# Patient Record
Sex: MALE | Race: WHITE | NOT HISPANIC OR LATINO | URBAN - METROPOLITAN AREA
[De-identification: names, ages, dates, MRNs, and addresses within clinical notes are randomized per-mention and may not be internally consistent; named-entity substitution may affect disease eponyms.]

---

## 2017-10-06 ENCOUNTER — APPOINTMENT (OUTPATIENT)
Dept: URBAN - METROPOLITAN AREA CLINIC 200 | Age: 77
Setting detail: DERMATOLOGY
End: 2017-10-15

## 2017-10-06 DIAGNOSIS — L57.8 OTHER SKIN CHANGES DUE TO CHRONIC EXPOSURE TO NONIONIZING RADIATION: ICD-10-CM

## 2017-10-06 DIAGNOSIS — B07.8 OTHER VIRAL WARTS: ICD-10-CM

## 2017-10-06 PROCEDURE — OTHER COUNSELING: OTHER

## 2017-10-06 PROCEDURE — 99213 OFFICE O/P EST LOW 20 MIN: CPT | Mod: 25

## 2017-10-06 PROCEDURE — 17110 DESTRUCT B9 LESION 1-14: CPT

## 2017-10-06 PROCEDURE — OTHER LIQUID NITROGEN: OTHER

## 2017-10-06 ASSESSMENT — LOCATION SIMPLE DESCRIPTION DERM
LOCATION SIMPLE: RIGHT KNEE
LOCATION SIMPLE: RIGHT UPPER BACK

## 2017-10-06 ASSESSMENT — LOCATION ZONE DERM
LOCATION ZONE: LEG
LOCATION ZONE: TRUNK

## 2017-10-06 ASSESSMENT — LOCATION DETAILED DESCRIPTION DERM
LOCATION DETAILED: RIGHT MID-UPPER BACK
LOCATION DETAILED: RIGHT KNEE

## 2017-10-06 NOTE — PROCEDURE: LIQUID NITROGEN
Include Z78.9 (Other Specified Conditions Influencing Health Status) As An Associated Diagnosis?: Yes
Consent: The patient's consent was obtained including but not limited to risks of crusting, scabbing, blistering, scarring, darker or lighter pigmentary change, recurrence, incomplete removal and infection.
Render Post-Care Instructions In Note?: no
Medical Necessity Information: It is in your best interest to select a reason for this procedure from the list below. All of these items fulfill various CMS LCD requirements except the new and changing color options.
Post-Care Instructions: I reviewed with the patient in detail post-care instructions. Patient is to wear sunprotection, and avoid picking at any of the treated lesions. Pt may apply Vaseline to crusted or scabbing areas.
Detail Level: Detailed
Medical Necessity Clause: This procedure was medically necessary because the lesions that were treated were: causing pain
Number Of Freeze-Thaw Cycles: 2 freeze-thaw cycles

## 2018-09-14 ENCOUNTER — TELEPHONE (OUTPATIENT)
Dept: PRIMARY CARE | Facility: CLINIC | Age: 78
End: 2018-09-14

## 2018-09-14 NOTE — TELEPHONE ENCOUNTER
This patient was told at August visit by Dr. Colorado to call and get order for labs to go to [facility] added to [facility].  I do not see order in last system for lab listing.

## 2018-09-17 DIAGNOSIS — D64.9 ANEMIA, UNSPECIFIED TYPE: ICD-10-CM

## 2018-09-17 DIAGNOSIS — Z12.5 SPECIAL SCREENING FOR MALIGNANT NEOPLASM OF PROSTATE: ICD-10-CM

## 2018-09-17 DIAGNOSIS — E78.00 PURE HYPERCHOLESTEROLEMIA: Primary | ICD-10-CM

## 2018-09-17 DIAGNOSIS — N39.41 URGE INCONTINENCE: ICD-10-CM

## 2018-10-04 ENCOUNTER — APPOINTMENT (OUTPATIENT)
Dept: URBAN - METROPOLITAN AREA CLINIC 200 | Age: 78
Setting detail: DERMATOLOGY
End: 2018-10-09

## 2018-10-04 DIAGNOSIS — L57.0 ACTINIC KERATOSIS: ICD-10-CM

## 2018-10-04 DIAGNOSIS — L57.8 OTHER SKIN CHANGES DUE TO CHRONIC EXPOSURE TO NONIONIZING RADIATION: ICD-10-CM

## 2018-10-04 DIAGNOSIS — D485 NEOPLASM OF UNCERTAIN BEHAVIOR OF SKIN: ICD-10-CM

## 2018-10-04 PROBLEM — Z85.828 PERSONAL HISTORY OF OTHER MALIGNANT NEOPLASM OF SKIN: Status: ACTIVE | Noted: 2018-10-04

## 2018-10-04 PROBLEM — D48.5 NEOPLASM OF UNCERTAIN BEHAVIOR OF SKIN: Status: ACTIVE | Noted: 2018-10-04

## 2018-10-04 LAB
ALBUMIN SERPL-MCNC: 3.9 G/DL (ref 3.6–5.1)
ALBUMIN/GLOB SERPL: 1.3 (CALC) (ref 1–2.5)
ALP SERPL-CCNC: 83 U/L (ref 40–115)
ALT SERPL-CCNC: 16 U/L (ref 9–46)
APPEARANCE UR: CLEAR
AST SERPL-CCNC: 19 U/L (ref 10–35)
BASOPHILS # BLD AUTO: 38 CELLS/UL (ref 0–200)
BASOPHILS NFR BLD AUTO: 0.6 %
BILIRUB SERPL-MCNC: 1 MG/DL (ref 0.2–1.2)
BILIRUB UR QL STRIP: NEGATIVE
BUN SERPL-MCNC: 14 MG/DL (ref 7–25)
BUN/CREAT SERPL: ABNORMAL (CALC) (ref 6–22)
CALCIUM SERPL-MCNC: 9.2 MG/DL (ref 8.6–10.3)
CHLORIDE SERPL-SCNC: 102 MMOL/L (ref 98–110)
CHOLEST SERPL-MCNC: 127 MG/DL
CHOLEST/HDLC SERPL: 3.3 (CALC)
CO2 SERPL-SCNC: 31 MMOL/L (ref 20–32)
COLOR UR: YELLOW
CREAT SERPL-MCNC: 0.83 MG/DL (ref 0.7–1.18)
EOSINOPHIL # BLD AUTO: 132 CELLS/UL (ref 15–500)
EOSINOPHIL NFR BLD AUTO: 2.1 %
ERYTHROCYTE [DISTWIDTH] IN BLOOD BY AUTOMATED COUNT: 12.1 % (ref 11–15)
GFR SERPL CREATININE-BSD FRML MDRD: 84 ML/MIN/1.73M2
GLOBULIN SER CALC-MCNC: 3.1 G/DL (CALC) (ref 1.9–3.7)
GLUCOSE SERPL-MCNC: 106 MG/DL (ref 65–99)
GLUCOSE UR QL STRIP: NEGATIVE
HCT VFR BLD AUTO: 41.2 % (ref 38.5–50)
HDLC SERPL-MCNC: 39 MG/DL
HGB BLD-MCNC: 13.3 G/DL (ref 13.2–17.1)
HGB UR QL STRIP: NEGATIVE
KETONES UR QL STRIP: NEGATIVE
LDLC SERPL CALC-MCNC: 72 MG/DL (CALC)
LEUKOCYTE ESTERASE UR QL STRIP: NEGATIVE
LYMPHOCYTES # BLD AUTO: 1462 CELLS/UL (ref 850–3900)
LYMPHOCYTES NFR BLD AUTO: 23.2 %
MCH RBC QN AUTO: 30.7 PG (ref 27–33)
MCHC RBC AUTO-ENTMCNC: 32.3 G/DL (ref 32–36)
MCV RBC AUTO: 95.2 FL (ref 80–100)
MONOCYTES # BLD AUTO: 674 CELLS/UL (ref 200–950)
MONOCYTES NFR BLD AUTO: 10.7 %
NEUTROPHILS # BLD AUTO: 3994 CELLS/UL (ref 1500–7800)
NEUTROPHILS NFR BLD AUTO: 63.4 %
NITRITE UR QL STRIP: NEGATIVE
NONHDLC SERPL-MCNC: 88 MG/DL (CALC)
PH UR STRIP: 6 [PH] (ref 5–8)
PLATELET # BLD AUTO: 280 THOUSAND/UL (ref 140–400)
PMV BLD REES-ECKER: 9.7 FL (ref 7.5–12.5)
POTASSIUM SERPL-SCNC: 4.5 MMOL/L (ref 3.5–5.3)
PROT SERPL-MCNC: 7 G/DL (ref 6.1–8.1)
PROT UR QL STRIP: NEGATIVE
PSA SERPL-MCNC: 3.7 NG/ML
RBC # BLD AUTO: 4.33 MILLION/UL (ref 4.2–5.8)
SODIUM SERPL-SCNC: 139 MMOL/L (ref 135–146)
SP GR UR STRIP: 1.02 (ref 1–1.03)
TRIGL SERPL-MCNC: 78 MG/DL
WBC # BLD AUTO: 6.3 THOUSAND/UL (ref 3.8–10.8)

## 2018-10-04 PROCEDURE — 17003 DESTRUCT PREMALG LES 2-14: CPT

## 2018-10-04 PROCEDURE — OTHER COUNSELING: OTHER

## 2018-10-04 PROCEDURE — 11100: CPT | Mod: 59

## 2018-10-04 PROCEDURE — OTHER MIPS QUALITY: OTHER

## 2018-10-04 PROCEDURE — OTHER LIQUID NITROGEN: OTHER

## 2018-10-04 PROCEDURE — 99213 OFFICE O/P EST LOW 20 MIN: CPT | Mod: 25

## 2018-10-04 PROCEDURE — 17000 DESTRUCT PREMALG LESION: CPT

## 2018-10-04 PROCEDURE — OTHER BIOPSY BY SHAVE METHOD: OTHER

## 2018-10-04 ASSESSMENT — LOCATION SIMPLE DESCRIPTION DERM
LOCATION SIMPLE: RIGHT CHEEK
LOCATION SIMPLE: LEFT FOREHEAD
LOCATION SIMPLE: CHEST
LOCATION SIMPLE: RIGHT ANTERIOR NECK

## 2018-10-04 ASSESSMENT — LOCATION ZONE DERM
LOCATION ZONE: TRUNK
LOCATION ZONE: FACE
LOCATION ZONE: NECK

## 2018-10-04 ASSESSMENT — LOCATION DETAILED DESCRIPTION DERM
LOCATION DETAILED: RIGHT INFERIOR ANTERIOR NECK
LOCATION DETAILED: RIGHT SUPERIOR MEDIAL MALAR CHEEK
LOCATION DETAILED: LEFT SUPERIOR LATERAL FOREHEAD
LOCATION DETAILED: LEFT MEDIAL SUPERIOR CHEST

## 2018-10-04 NOTE — PROCEDURE: BIOPSY BY SHAVE METHOD
Notification Instructions: Patient will be notified of biopsy results. However, patient instructed to call the office if not contacted within 2 weeks.
Render Post-Care Instructions In Note?: no
Cryotherapy Text: The wound bed was treated with cryotherapy after the biopsy was performed.
Type Of Destruction Used: Curettage
Additional Anesthesia Volume In Cc (Will Not Render If 0): 0
Post-Care Instructions: I reviewed with the patient in detail post-care instructions. Patient is to keep the biopsy site dry overnight, and then apply bacitracin twice daily until healed. Patient may apply hydrogen peroxide soaks to remove any crusting.
Wound Care: Petrolatum
Hemostasis: Drysol
Was A Bandage Applied: Yes
Detail Level: Detailed
Consent: Written consent was obtained and risks were reviewed including but not limited to scarring, infection, bleeding, scabbing, incomplete removal, nerve damage and allergy to anesthesia.
Electrodesiccation Text: The wound bed was treated with electrodesiccation after the biopsy was performed.
Silver Nitrate Text: The wound bed was treated with silver nitrate after the biopsy was performed.
Anesthesia Volume In Cc (Will Not Render If 0): 0.5
Curettage Text: The wound bed was treated with curettage after the biopsy was performed.
Biopsy Type: H and E
Billing Type: Third-Party Bill
Biopsy Method: Dermablade
Depth Of Biopsy: dermis
Electrodesiccation And Curettage Text: The wound bed was treated with electrodesiccation and curettage after the biopsy was performed.
Dressing: bandage
Anesthesia Type: 0.5% lidocaine without epinephrine

## 2018-10-04 NOTE — PROCEDURE: LIQUID NITROGEN
Render Post-Care Instructions In Note?: no
Post-Care Instructions: I reviewed with the patient in detail post-care instructions. Patient is to wear sunprotection, and avoid picking at any of the treated lesions. Pt may apply Vaseline to crusted or scabbing areas.
Detail Level: Detailed
Duration Of Freeze Thaw-Cycle (Seconds): 2
Consent: The patient's consent was obtained including but not limited to risks of crusting, scabbing, blistering, scarring, darker or lighter pigmentary change, recurrence, incomplete removal and infection.

## 2019-03-08 ENCOUNTER — TELEPHONE (OUTPATIENT)
Dept: PRIMARY CARE | Facility: CLINIC | Age: 79
End: 2019-03-08

## 2019-03-08 NOTE — TELEPHONE ENCOUNTER
Patient has a labslip from Dr Cortes to have the following studies done:  CMP, CBC w/o diff, and Lipid panel with non HDL/C.  He has an appt here on 4/8/19.  Please let him know if you would like any additional labs. (I did fax the lab results done 10/3/18 to Dr Cortes this morning, as patient is not certain he received them).  Pt ph# [phone]

## 2019-03-27 LAB
ALBUMIN SERPL-MCNC: 4 G/DL (ref 3.6–5.1)
ALBUMIN/GLOB SERPL: 1.3 (CALC) (ref 1–2.5)
ALP SERPL-CCNC: 76 U/L (ref 40–115)
ALT SERPL-CCNC: 26 U/L (ref 9–46)
AST SERPL-CCNC: 31 U/L (ref 10–35)
BASOPHILS # BLD AUTO: 33 CELLS/UL (ref 0–200)
BASOPHILS NFR BLD AUTO: 0.5 %
BILIRUB SERPL-MCNC: 1 MG/DL (ref 0.2–1.2)
BUN SERPL-MCNC: 19 MG/DL (ref 7–25)
BUN/CREAT SERPL: NORMAL (CALC) (ref 6–22)
CALCIUM SERPL-MCNC: 9.3 MG/DL (ref 8.6–10.3)
CHLORIDE SERPL-SCNC: 104 MMOL/L (ref 98–110)
CHOLEST SERPL-MCNC: 148 MG/DL
CHOLEST/HDLC SERPL: 3.6 (CALC)
CO2 SERPL-SCNC: 30 MMOL/L (ref 20–32)
CREAT SERPL-MCNC: 0.85 MG/DL (ref 0.7–1.18)
EOSINOPHIL # BLD AUTO: 119 CELLS/UL (ref 15–500)
EOSINOPHIL NFR BLD AUTO: 1.8 %
ERYTHROCYTE [DISTWIDTH] IN BLOOD BY AUTOMATED COUNT: 12.1 % (ref 11–15)
GLOBULIN SER CALC-MCNC: 3.1 G/DL (CALC) (ref 1.9–3.7)
GLUCOSE SERPL-MCNC: 94 MG/DL (ref 65–99)
HCT VFR BLD AUTO: 42 % (ref 38.5–50)
HDLC SERPL-MCNC: 41 MG/DL
HGB BLD-MCNC: 13.8 G/DL (ref 13.2–17.1)
LDLC SERPL CALC-MCNC: 90 MG/DL (CALC)
LYMPHOCYTES # BLD AUTO: 1188 CELLS/UL (ref 850–3900)
LYMPHOCYTES NFR BLD AUTO: 18 %
MCH RBC QN AUTO: 30.6 PG (ref 27–33)
MCHC RBC AUTO-ENTMCNC: 32.9 G/DL (ref 32–36)
MCV RBC AUTO: 93.1 FL (ref 80–100)
MONOCYTES # BLD AUTO: 779 CELLS/UL (ref 200–950)
MONOCYTES NFR BLD AUTO: 11.8 %
NEUTROPHILS # BLD AUTO: 4481 CELLS/UL (ref 1500–7800)
NEUTROPHILS NFR BLD AUTO: 67.9 %
NONHDLC SERPL-MCNC: 107 MG/DL (CALC)
PLATELET # BLD AUTO: 270 THOUSAND/UL (ref 140–400)
PMV BLD REES-ECKER: 9.6 FL (ref 7.5–12.5)
POTASSIUM SERPL-SCNC: 4.4 MMOL/L (ref 3.5–5.3)
PROT SERPL-MCNC: 7.1 G/DL (ref 6.1–8.1)
QUEST EGFR NON-AFR. AMERICAN: 83 ML/MIN/1.73M2
RBC # BLD AUTO: 4.51 MILLION/UL (ref 4.2–5.8)
SODIUM SERPL-SCNC: 139 MMOL/L (ref 135–146)
TRIGL SERPL-MCNC: 79 MG/DL
WBC # BLD AUTO: 6.6 THOUSAND/UL (ref 3.8–10.8)

## 2019-04-08 ENCOUNTER — OFFICE VISIT (OUTPATIENT)
Dept: PRIMARY CARE | Facility: CLINIC | Age: 79
End: 2019-04-08
Payer: COMMERCIAL

## 2019-04-08 VITALS
DIASTOLIC BLOOD PRESSURE: 60 MMHG | WEIGHT: 166 LBS | HEIGHT: 66 IN | BODY MASS INDEX: 26.68 KG/M2 | OXYGEN SATURATION: 98 % | HEART RATE: 60 BPM | SYSTOLIC BLOOD PRESSURE: 132 MMHG

## 2019-04-08 DIAGNOSIS — I48.0 PAROXYSMAL ATRIAL FIBRILLATION (CMS/HCC): ICD-10-CM

## 2019-04-08 DIAGNOSIS — J45.30 MILD PERSISTENT ASTHMA WITHOUT COMPLICATION: ICD-10-CM

## 2019-04-08 DIAGNOSIS — E78.00 HYPERCHOLESTEROLEMIA: ICD-10-CM

## 2019-04-08 DIAGNOSIS — S89.92XA INJURY OF LEFT KNEE, INITIAL ENCOUNTER: Primary | ICD-10-CM

## 2019-04-08 PROCEDURE — 99215 OFFICE O/P EST HI 40 MIN: CPT | Performed by: INTERNAL MEDICINE

## 2019-04-08 RX ORDER — TAMSULOSIN HYDROCHLORIDE 0.4 MG/1
0.4 CAPSULE ORAL
COMMUNITY

## 2019-04-08 RX ORDER — ATORVASTATIN CALCIUM 40 MG/1
40 TABLET, FILM COATED ORAL DAILY
Qty: 90 TABLET | Refills: 3 | Status: SHIPPED | OUTPATIENT
Start: 2019-04-08 | End: 2020-01-16

## 2019-04-08 RX ORDER — MONTELUKAST SODIUM 10 MG/1
10 TABLET ORAL NIGHTLY
Qty: 90 TABLET | Refills: 3 | Status: SHIPPED | OUTPATIENT
Start: 2019-04-08 | End: 2020-01-16 | Stop reason: SDUPTHER

## 2019-04-08 RX ORDER — SOTALOL HYDROCHLORIDE 80 MG/1
80 TABLET ORAL 2 TIMES DAILY
COMMUNITY
Start: 2018-04-12 | End: 2025-02-13

## 2019-04-08 RX ORDER — RIVAROXABAN 20 MG/1
TABLET, FILM COATED ORAL
Refills: 2 | Status: ON HOLD | COMMUNITY
Start: 2019-03-30 | End: 2025-02-19

## 2019-04-08 RX ORDER — OMEGA-3-ACID ETHYL ESTERS 1 G/1
1000 CAPSULE, LIQUID FILLED ORAL DAILY
Status: ON HOLD | COMMUNITY
End: 2025-02-19

## 2019-04-08 RX ORDER — MONTELUKAST SODIUM 10 MG/1
10 TABLET ORAL
COMMUNITY
End: 2019-04-08 | Stop reason: SDUPTHER

## 2019-04-08 RX ORDER — ATORVASTATIN CALCIUM 40 MG/1
40 TABLET, FILM COATED ORAL DAILY
COMMUNITY
End: 2019-04-08 | Stop reason: SDUPTHER

## 2019-04-08 NOTE — PROGRESS NOTES
[facility]  Dr. Christine Coolrado  [address], [address]  [place] [address]  Phone: [phone]  Fax: [phone]      Patient ID: Bertrand Maher Jr                              : 1940    Visit Date: 2019    Chief Complaint: Follow-up (S/P fall from yesterday-injured left knee)      HPI  Fell over a stone wall yesterday and hurt his left knee. It is swollen medially. The original knee replacement was from  and the current partial is about 8 years ago. Has seen [facility] for the.right, Victor M.  Using Tylenol.  Hurts up and down the steps. Fell on the lawn.  Colonoscopy .  To [facility] next week.  SOB with climbing steps.  Still has it. Dr. Cortes to see him next week.  Also on the pt's list of concerns is that his wife thinks his memory is poor. He does not think so. He also does not think he is hard of hearing.        Subjective      Patient ID: Bertrand Maher Jr is a 79 y.o. male.    Patient Active Problem List   Diagnosis   • Allergic rhinitis   • Anemia   • Asthma   • BPH (benign prostatic hyperplasia)   • Cervical spondylosis   • Coronary artery stenosis   • Lumbar spondylosis   • History of knee replacement   • Osteoarthritis, hand   • Osteoarthritis of hip   • Other hypertrophic osteoarthropathy, multiple sites   • Atrial fibrillation (CMS/HCC)   • Status post angioplasty   • Hypercholesterolemia   • Urge incontinence   • Injury of left knee         Current Outpatient Prescriptions:   •  atorvastatin (LIPITOR) 40 mg tablet, Take 1 tablet (40 mg total) by mouth daily., Disp: 90 tablet, Rfl: 3  •  montelukast (SINGULAIR) 10 mg tablet, Take 1 tablet (10 mg total) by mouth nightly., Disp: 90 tablet, Rfl: 3  •  omega-3 acid ethyl esters (LOVAZA) 1 gram capsule, Take 1,000 mg by mouth daily., Disp: , Rfl:   •  sotalol (BETAPACE) 80 mg tablet, Take 80 mg by mouth 2 times daily., Disp: , Rfl:   •  tamsulosin (FLOMAX) 0.4 mg capsule, Take 0.4 mg by  mouth., Disp: , Rfl:   •  XARELTO 20 mg tablet, TAKE 1 TABLET BY MOUTH EVERY DAY WITH DINNER, Disp: , Rfl: 2    Allergies   Allergen Reactions   • Apixaban      Other reaction(s): Headache    • Oxycodone Anxiety       Social History     Social History   • Marital status:      Spouse name: N/A   • Number of children: N/A   • Years of education: N/A     Occupational History   • Not on file.     Social History Main Topics   • Smoking status: Never Smoker   • Smokeless tobacco: Never Used   • Alcohol use Not on file   • Drug use: Unknown   • Sexual activity: Not on file     Other Topics Concern   • Not on file     Social History Narrative   • No narrative on file       Health Maintenance   Topic Date Due   • DTaP, Tdap, and Td Vaccines (1 - Tdap) 02/28/1959   • Zoster Vaccine (1 of 2) 02/28/1990   • Annual Falls Risk Screening  02/28/2005   • Meningococcal ACWY  Aged Out   • Varicella Vaccines  Aged Out   • HIB Vaccines  Aged Out   • IPV Vaccines  Aged Out   • Influenza Vaccine  Completed   • HPV Vaccines  Aged Out   • Pneumococcal (0-64 years)  Aged Out   • Pneumococcal (65 years and older)  Completed       Past Medical History:   Diagnosis Date   • Allergic rhinitis    • Anemia    • Asthma    • Atrial fibrillation (CMS/HCC)    • BPH (benign prostatic hyperplasia)    • Cataract    • Cervical spondylosis    • Coronary artery stenosis    • History of knee replacement    • Hypercholesterolemia    • Lumbar spondylosis    • Osteoarthritis of hip    • Osteoarthritis, hand    • Other hypertrophic osteoarthropathy, multiple sites    • Status post angioplasty    • Urge incontinence        The following have been reviewed and updated as appropriate in this visit:  Allergies  Meds  Problems         Review of System  Review of Systems   Constitutional: Positive for activity change. Negative for chills, fatigue and unexpected weight change.   HENT: Negative for congestion, hearing loss, mouth sores and sinus pain.   "  Eyes: Negative for visual disturbance.   Respiratory: Negative for cough, chest tightness and shortness of breath.    Cardiovascular: Negative for chest pain and palpitations.   Gastrointestinal: Negative for abdominal pain, constipation and diarrhea.   Genitourinary: Negative for difficulty urinating and frequency.   Musculoskeletal: Positive for arthralgias, back pain and joint swelling (medial aspect of the right knee).   Neurological: Negative for dizziness, tremors, weakness and numbness.   Hematological: Negative for adenopathy. Does not bruise/bleed easily.       Objective     Vitals  Vitals:    04/08/19 1342   BP: 132/60   BP Location: Left upper arm   Patient Position: Sitting   Pulse: 60   SpO2: 98%   Weight: 75.3 kg (166 lb)   Height: 1.676 m (5' 6\")       Body mass index is 26.79 kg/m².    Physical Exam  Physical Exam   Constitutional: He is oriented to person, place, and time. He appears well-nourished. No distress (stiffness and pain with getting up and down from the exam table.).   HENT:   Head: Normocephalic and atraumatic.   Nose: Nose normal.   Mouth/Throat: Oropharynx is clear and moist.   Eyes: Pupils are equal, round, and reactive to light. Conjunctivae and EOM are normal.   Neck: Normal range of motion. Neck supple. No thyromegaly present.   Cardiovascular: Normal rate, regular rhythm and normal heart sounds.  Exam reveals no gallop.    No murmur heard.  Pulmonary/Chest: Effort normal and breath sounds normal. He has no wheezes. He has no rales.   Abdominal: Soft. Bowel sounds are normal. There is no tenderness.   Musculoskeletal: He exhibits deformity (left knee scar from replacement surgery with medial tenderness and swelling). He exhibits no edema.   Lymphadenopathy:     He has no cervical adenopathy.   Neurological: He is alert and oriented to person, place, and time. He displays normal reflexes. Coordination normal.   Skin: Skin is warm and dry.   Vitals reviewed.    Pt scored 30/30 on " MMSE performed during the visit.  Able to name 13 creatures in one minute.    Labs 3/26/2019  CMP, CBC and lipid panel normal.  GI letter 2/28/2019 screening recommended.    Assessment/Plan     Problem List Items Addressed This Visit     Asthma    Relevant Medications    montelukast (SINGULAIR) 10 mg tablet    Atrial fibrillation (CMS/HCC)     Has regular follow up with Dr. Cortes, cardiology.         Relevant Medications    XARELTO 20 mg tablet    sotalol (BETAPACE) 80 mg tablet    atorvastatin (LIPITOR) 40 mg tablet    Hypercholesterolemia    Relevant Medications    omega-3 acid ethyl esters (LOVAZA) 1 gram capsule    atorvastatin (LIPITOR) 40 mg tablet    Injury of left knee - Primary    Relevant Orders    Ambulatory referral to Orthopedic Surgery      Discussed the pt's prevention and his current complaints.   The knee is better today and he declines any assistance to schedule appointment with ortho. He will see the surgeon if the sxs at his knee persist  There is no significant evidence of dementia or significant cognitive impairment. Have encouraged the pt to discuss the circumstances that have concerned his wife. We can consider labs and perhaps an MRI of the brain should it make sense to do so.    Greater than 50% of the visit time was spent in counseling and coordination of care.  40 minutes.      There are no Patient Instructions on file for this visit.    Return in about 6 months (around 10/8/2019), or if symptoms worsen or fail to improve.      Christine Colorado MD  4/9/2019

## 2019-04-09 PROBLEM — S89.92XA INJURY OF LEFT KNEE: Status: ACTIVE | Noted: 2019-04-09

## 2019-04-09 ASSESSMENT — ENCOUNTER SYMPTOMS
NUMBNESS: 0
BRUISES/BLEEDS EASILY: 0
ACTIVITY CHANGE: 1
ARTHRALGIAS: 1
DIARRHEA: 0
FATIGUE: 0
ADENOPATHY: 0
FREQUENCY: 0
CHILLS: 0
COUGH: 0
SINUS PAIN: 0
TREMORS: 0
WEAKNESS: 0
JOINT SWELLING: 1
DIFFICULTY URINATING: 0
CONSTIPATION: 0
DIZZINESS: 0
SHORTNESS OF BREATH: 0
CHEST TIGHTNESS: 0
UNEXPECTED WEIGHT CHANGE: 0
BACK PAIN: 1
PALPITATIONS: 0
ABDOMINAL PAIN: 0

## 2019-05-03 ENCOUNTER — TRANSCRIBE ORDERS (OUTPATIENT)
Dept: SCHEDULING | Age: 79
End: 2019-05-03

## 2019-05-03 DIAGNOSIS — M54.50 LOW BACK PAIN: ICD-10-CM

## 2019-05-03 DIAGNOSIS — M25.561 PAIN IN RIGHT KNEE: Primary | ICD-10-CM

## 2019-05-08 ENCOUNTER — HOSPITAL ENCOUNTER (OUTPATIENT)
Dept: RADIOLOGY | Age: 79
Discharge: HOME | End: 2019-05-08
Attending: ORTHOPAEDIC SURGERY
Payer: COMMERCIAL

## 2019-05-08 DIAGNOSIS — M54.50 LOW BACK PAIN: ICD-10-CM

## 2019-05-08 DIAGNOSIS — M25.561 PAIN IN RIGHT KNEE: ICD-10-CM

## 2019-07-31 LAB
BUN SERPL-MCNC: 15 MG/DL (ref 7–25)
BUN/CREAT SERPL: NORMAL (CALC) (ref 6–22)
CALCIUM SERPL-MCNC: 9.1 MG/DL (ref 8.6–10.3)
CHLORIDE SERPL-SCNC: 102 MMOL/L (ref 98–110)
CO2 SERPL-SCNC: 31 MMOL/L (ref 20–32)
CREAT SERPL-MCNC: 0.84 MG/DL (ref 0.7–1.18)
GLUCOSE SERPL-MCNC: 97 MG/DL (ref 65–99)
MAGNESIUM SERPL-MCNC: 2.2 MG/DL (ref 1.5–2.5)
POTASSIUM SERPL-SCNC: 4.6 MMOL/L (ref 3.5–5.3)
QUEST EGFR NON-AFR. AMERICAN: 83 ML/MIN/1.73M2
SODIUM SERPL-SCNC: 139 MMOL/L (ref 135–146)

## 2019-08-09 LAB
ALBUMIN SERPL-MCNC: 4.1 G/DL (ref 3.6–5.1)
ALBUMIN/GLOB SERPL: 1.3 (CALC) (ref 1–2.5)
ALP SERPL-CCNC: 104 U/L (ref 40–115)
ALT SERPL-CCNC: 12 U/L (ref 9–46)
AST SERPL-CCNC: 18 U/L (ref 10–35)
BILIRUB DIRECT SERPL-MCNC: 0.2 MG/DL
BILIRUB INDIRECT SERPL-MCNC: 0.6 MG/DL (CALC) (ref 0.2–1.2)
BILIRUB SERPL-MCNC: 0.8 MG/DL (ref 0.2–1.2)
CHOLEST SERPL-MCNC: 138 MG/DL
CHOLEST/HDLC SERPL: 3.3 (CALC)
GLOBULIN SER CALC-MCNC: 3.1 G/DL (CALC) (ref 1.9–3.7)
HDLC SERPL-MCNC: 42 MG/DL
LDLC SERPL CALC-MCNC: 83 MG/DL (CALC)
NONHDLC SERPL-MCNC: 96 MG/DL (CALC)
PROT SERPL-MCNC: 7.2 G/DL (ref 6.1–8.1)
TRIGL SERPL-MCNC: 54 MG/DL

## 2019-10-03 ENCOUNTER — APPOINTMENT (OUTPATIENT)
Dept: URBAN - METROPOLITAN AREA CLINIC 200 | Age: 79
Setting detail: DERMATOLOGY
End: 2019-10-15

## 2019-10-03 DIAGNOSIS — D18.0 HEMANGIOMA: ICD-10-CM

## 2019-10-03 DIAGNOSIS — L91.8 OTHER HYPERTROPHIC DISORDERS OF THE SKIN: ICD-10-CM

## 2019-10-03 DIAGNOSIS — Z85.828 PERSONAL HISTORY OF OTHER MALIGNANT NEOPLASM OF SKIN: ICD-10-CM

## 2019-10-03 DIAGNOSIS — L57.8 OTHER SKIN CHANGES DUE TO CHRONIC EXPOSURE TO NONIONIZING RADIATION: ICD-10-CM

## 2019-10-03 PROBLEM — D23.5 OTHER BENIGN NEOPLASM OF SKIN OF TRUNK: Status: ACTIVE | Noted: 2019-10-03

## 2019-10-03 PROBLEM — L55.1 SUNBURN OF SECOND DEGREE: Status: ACTIVE | Noted: 2019-10-03

## 2019-10-03 PROBLEM — L57.0 ACTINIC KERATOSIS: Status: ACTIVE | Noted: 2019-10-03

## 2019-10-03 PROBLEM — D18.01 HEMANGIOMA OF SKIN AND SUBCUTANEOUS TISSUE: Status: ACTIVE | Noted: 2019-10-03

## 2019-10-03 PROCEDURE — OTHER COUNSELING: OTHER

## 2019-10-03 PROCEDURE — OTHER MIPS QUALITY: OTHER

## 2019-10-03 PROCEDURE — 99213 OFFICE O/P EST LOW 20 MIN: CPT

## 2019-10-03 PROCEDURE — OTHER REASSURANCE: OTHER

## 2019-10-03 ASSESSMENT — LOCATION DETAILED DESCRIPTION DERM
LOCATION DETAILED: LEFT MEDIAL SUPERIOR CHEST
LOCATION DETAILED: RIGHT MEDIAL MALAR CHEEK
LOCATION DETAILED: LEFT INFERIOR ANTERIOR NECK
LOCATION DETAILED: STERNAL NOTCH
LOCATION DETAILED: LEFT SUPERIOR FOREHEAD

## 2019-10-03 ASSESSMENT — LOCATION SIMPLE DESCRIPTION DERM
LOCATION SIMPLE: RIGHT CHEEK
LOCATION SIMPLE: LEFT FOREHEAD
LOCATION SIMPLE: CHEST
LOCATION SIMPLE: LEFT ANTERIOR NECK

## 2019-10-03 ASSESSMENT — LOCATION ZONE DERM
LOCATION ZONE: FACE
LOCATION ZONE: TRUNK
LOCATION ZONE: NECK

## 2019-10-03 NOTE — PROCEDURE: REASSURANCE
Detail Level: Detailed
Include Location In Plan?: Yes
Hide Additional Notes?: No
Detail Level: Generalized
Additional Note: Cautery
Additional Note: Cryo, cautery

## 2019-10-03 NOTE — PROCEDURE: MIPS QUALITY
Detail Level: Detailed
Additional Notes: Shingles SHINGRIX vaccine not received due to it being on back order.
Quality 110: Preventive Care And Screening: Influenza Immunization: Influenza Immunization not Administered for Documented Reasons.
Quality 474: Zoster Vaccination Status: Shingrix vaccine was not administered for reasons documented by clinician (e.g. patient administered vaccine other than Shingrix, patient allergy or other medical reasons, patient declined or other patient reasons, vaccine not available or other system reasons)
Additional Notes: Patient has not received flu shot, but plans to.

## 2019-10-21 ENCOUNTER — TELEPHONE (OUTPATIENT)
Dept: PRIMARY CARE | Facility: CLINIC | Age: 79
End: 2019-10-21

## 2020-01-16 ENCOUNTER — OFFICE VISIT (OUTPATIENT)
Dept: PRIMARY CARE | Facility: CLINIC | Age: 80
End: 2020-01-16
Payer: COMMERCIAL

## 2020-01-16 VITALS
HEART RATE: 57 BPM | WEIGHT: 165 LBS | DIASTOLIC BLOOD PRESSURE: 60 MMHG | BODY MASS INDEX: 26.52 KG/M2 | HEIGHT: 66 IN | OXYGEN SATURATION: 96 % | SYSTOLIC BLOOD PRESSURE: 110 MMHG

## 2020-01-16 DIAGNOSIS — R06.02 SHORTNESS OF BREATH: Primary | ICD-10-CM

## 2020-01-16 DIAGNOSIS — D48.5 NEOPLASM OF UNCERTAIN BEHAVIOR OF SKIN: ICD-10-CM

## 2020-01-16 DIAGNOSIS — M25.561 CHRONIC PAIN OF RIGHT KNEE: ICD-10-CM

## 2020-01-16 DIAGNOSIS — I48.0 PAROXYSMAL ATRIAL FIBRILLATION (CMS/HCC): ICD-10-CM

## 2020-01-16 DIAGNOSIS — K12.30 MUCOSITIS: ICD-10-CM

## 2020-01-16 DIAGNOSIS — J45.30 MILD PERSISTENT ASTHMA WITHOUT COMPLICATION: ICD-10-CM

## 2020-01-16 DIAGNOSIS — R97.20 ELEVATED PSA: ICD-10-CM

## 2020-01-16 DIAGNOSIS — N40.0 BENIGN PROSTATIC HYPERPLASIA WITHOUT LOWER URINARY TRACT SYMPTOMS: ICD-10-CM

## 2020-01-16 DIAGNOSIS — I25.10 CORONARY ARTERY STENOSIS: ICD-10-CM

## 2020-01-16 DIAGNOSIS — G89.29 CHRONIC PAIN OF RIGHT KNEE: ICD-10-CM

## 2020-01-16 PROCEDURE — 99215 OFFICE O/P EST HI 40 MIN: CPT | Performed by: INTERNAL MEDICINE

## 2020-01-16 RX ORDER — ROSUVASTATIN CALCIUM 40 MG/1
40 TABLET, COATED ORAL
COMMUNITY
Start: 2019-12-22 | End: 2020-01-16 | Stop reason: SDUPTHER

## 2020-01-16 RX ORDER — ROSUVASTATIN CALCIUM 40 MG/1
40 TABLET, COATED ORAL
Qty: 90 TABLET | Refills: 3 | Status: SHIPPED | OUTPATIENT
Start: 2020-01-16

## 2020-01-16 RX ORDER — MONTELUKAST SODIUM 10 MG/1
10 TABLET ORAL NIGHTLY
Qty: 90 TABLET | Refills: 3 | Status: SHIPPED | OUTPATIENT
Start: 2020-01-16

## 2020-01-16 NOTE — PROGRESS NOTES
[facility]  Dr. Christine Colorado  [address], [address]  [place] [address]  Phone: [phone]  Fax: [phone]      Patient ID: Bertrand Maher Jr                              : 1940    Visit Date: 2020    Chief Complaint: Annual Exam      HPI  Recovering from a cold a week ago.  Seeing Dr. Cortes   Now a PPO.  Rosuvastatin.  Did not get the   Right knee is trouble. Planning on seeing Dr. Lima. Going from sitting to standing.  Very short of breath.  Does wood carving.  Bronchitis in his youth has left him short of breath.  Up ramp and 2 flights of steps.  Mouth sore dentist told him it would go away.  Dr. Dwyer retired and has Dr. Ackerman in April  Has a crusty lesion at the right shin. Does not seem to heal.      Current Outpatient Medications   Medication Sig Dispense Refill   • montelukast (SINGULAIR) 10 mg tablet Take 1 tablet (10 mg total) by mouth nightly. 90 tablet 3   • omega-3 acid ethyl esters (LOVAZA) 1 gram capsule Take 1,000 mg by mouth daily.     • rosuvastatin (CRESTOR) 40 mg tablet Take 1 tablet (40 mg total) by mouth once daily. 90 tablet 3   • sotalol (BETAPACE) 80 mg tablet Take 80 mg by mouth 2 times daily.     • tamsulosin (FLOMAX) 0.4 mg capsule Take 0.4 mg by mouth.     • XARELTO 20 mg tablet TAKE 1 TABLET BY MOUTH EVERY DAY WITH DINNER  2     No current facility-administered medications for this visit.        Allergies   Allergen Reactions   • Apixaban      Other reaction(s): Headache    • Oxycodone Anxiety       Past Surgical History:   Procedure Laterality Date   • REVISION TOTAL KNEE ARTHROPLASTY Right 2017   • TOTAL HIP ARTHROPLASTY Right 2017       Past Medical History:   Diagnosis Date   • Allergic rhinitis    • Anemia    • Asthma    • Atrial fibrillation (CMS/HCC)    • BPH (benign prostatic hyperplasia)    • Cataract    • Cervical spondylosis    • Coronary artery stenosis    • History of knee replacement    •  Hypercholesterolemia    • Lumbar spondylosis    • Osteoarthritis of hip    • Osteoarthritis, hand    • Other hypertrophic osteoarthropathy, multiple sites    • Status post angioplasty    • Urge incontinence        Health Maintenance   Topic Date Due   • DTaP, Tdap, and Td Vaccines (1 - Tdap) 02/28/1959   • Zoster Vaccine (1 of 2) 02/28/1990   • Annual Falls Risk Screening  02/28/2005   • Medicare Annual Wellness Visit  04/02/2020 (Originally 2/28/1958)   • Influenza Vaccine  Completed   • Pneumococcal (65 years and older)  Completed   • Meningococcal ACWY  Aged Out   • Varicella Vaccines  Aged Out   • HIB Vaccines  Aged Out   • IPV Vaccines  Aged Out   • HPV Vaccines  Aged Out   • Pneumococcal  Aged Out       Social History     Socioeconomic History   • Marital status:      Spouse name: None   • Number of children: None   • Years of education: None   • Highest education level: None   Occupational History   • None   Social Needs   • Financial resource strain: None   • Food insecurity:     Worry: None     Inability: None   • Transportation needs:     Medical: None     Non-medical: None   Tobacco Use   • Smoking status: Never Smoker   • Smokeless tobacco: Never Used   Substance and Sexual Activity   • Alcohol use: None   • Drug use: None   • Sexual activity: None   Lifestyle   • Physical activity:     Days per week: None     Minutes per session: None   • Stress: None   Relationships   • Social connections:     Talks on phone: None     Gets together: None     Attends Religion service: None     Active member of club or organization: None     Attends meetings of clubs or organizations: None     Relationship status: None   • Intimate partner violence:     Fear of current or ex partner: None     Emotionally abused: None     Physically abused: None     Forced sexual activity: None   Other Topics Concern   • None   Social History Narrative   • None       No family history on file.    The following have been reviewed  "and updated as appropriate in this visit:  Allergies  Meds  Problems         Review of Systems  Review of Systems   Constitutional: Negative for activity change, fatigue and unexpected weight change.   HENT: Negative for congestion.    Respiratory: Negative for cough, shortness of breath and stridor.    Cardiovascular: Negative for chest pain, palpitations and leg swelling.   Gastrointestinal: Negative for abdominal pain.   Genitourinary: Negative for difficulty urinating.   Musculoskeletal: Positive for arthralgias. Negative for gait problem.   Neurological: Negative for dizziness and headaches.   Hematological: Negative for adenopathy.        Vitals:    01/16/20 1402   BP: 110/60   BP Location: Left upper arm   Patient Position: Sitting   Pulse: (!) 57   SpO2: 96%   Weight: 74.8 kg (165 lb)   Height: 1.676 m (5' 6\")       Body mass index is 26.63 kg/m².    Physical Exam  Physical Exam   Constitutional: He is oriented to person, place, and time. He appears well-developed and well-nourished. No distress.   HENT:   Head: Normocephalic and atraumatic.   Nose: Nose normal.   Mouth/Throat: Oropharynx is clear and moist.   Neck: Normal range of motion. Neck supple. No thyromegaly present.   Cardiovascular: Normal rate, regular rhythm and normal heart sounds. Exam reveals no gallop.   No murmur heard.  Pulmonary/Chest: Effort normal and breath sounds normal. He has no wheezes. He has no rales.   Abdominal: Soft. Bowel sounds are normal. He exhibits no mass. There is no tenderness. There is no guarding.   Musculoskeletal: He exhibits deformity. He exhibits no edema or tenderness.   Lymphadenopathy:     He has no cervical adenopathy.   Neurological: He is alert and oriented to person, place, and time. He displays normal reflexes. Coordination normal.   Skin: Skin is warm.   Small raised gray dry appearing lesion right shin 0.7 cm   Psychiatric: He has a normal mood and affect. Judgment and thought content normal. "   Vitals reviewed.      Assessment/Plan     Problem List Items Addressed This Visit        Nervous    Chronic pain of right knee    Current Assessment & Plan     To see ortho. Consider second opinion.            Respiratory    Asthma    Relevant Medications    montelukast (SINGULAIR) 10 mg tablet    Shortness of breath - Primary    Relevant Orders    Pulmonary function tests Spirometry before and after bronchodilator, Diffusing capacity, Lung volumes (Plethysmography or Nitrogen washout)    CBC and differential    TSH    Urinalysis with Reflex Culture    Sedimentation rate, automated    X-RAY CHEST 2 VIEWS       Circulatory    Coronary artery stenosis    Relevant Medications    rosuvastatin (CRESTOR) 40 mg tablet    Other Relevant Orders    Comprehensive metabolic panel    Lipid panel    Atrial fibrillation (CMS/HCC)    Relevant Medications    rosuvastatin (CRESTOR) 40 mg tablet       Genitourinary    BPH (benign prostatic hyperplasia)       Infectious/Inflammatory    Mucositis    Current Assessment & Plan     Mucous membrane still sore. To try mouthwash daily.            Other    Elevated PSA    Current Assessment & Plan     Followed by urology.         Neoplasm of uncertain behavior of skin    Current Assessment & Plan     Follow up with dermatologist            Greater than 50% of the visit time was spent in counseling and coordination of care.  40 minutes.      There are no Patient Instructions on file for this visit.  No follow-ups on file.          Christine Colorado MD  1/19/2020

## 2020-01-19 PROBLEM — G89.29 CHRONIC PAIN OF RIGHT KNEE: Status: ACTIVE | Noted: 2020-01-19

## 2020-01-19 PROBLEM — M25.561 CHRONIC PAIN OF RIGHT KNEE: Status: ACTIVE | Noted: 2020-01-19

## 2020-01-19 PROBLEM — D48.5 NEOPLASM OF UNCERTAIN BEHAVIOR OF SKIN: Status: ACTIVE | Noted: 2020-01-19

## 2020-01-19 ASSESSMENT — ENCOUNTER SYMPTOMS
ABDOMINAL PAIN: 0
COUGH: 0
ACTIVITY CHANGE: 0
ADENOPATHY: 0
DIFFICULTY URINATING: 0
UNEXPECTED WEIGHT CHANGE: 0
SHORTNESS OF BREATH: 0
HEADACHES: 0
STRIDOR: 0
ARTHRALGIAS: 1
PALPITATIONS: 0
FATIGUE: 0
DIZZINESS: 0

## 2020-01-22 LAB
ALBUMIN SERPL-MCNC: 3.7 G/DL (ref 3.6–5.1)
ALBUMIN/GLOB SERPL: 1.2 (CALC) (ref 1–2.5)
ALP SERPL-CCNC: 68 U/L (ref 40–115)
ALT SERPL-CCNC: 15 U/L (ref 9–46)
APPEARANCE UR: CLEAR
AST SERPL-CCNC: 18 U/L (ref 10–35)
BACTERIA #/AREA URNS HPF: NORMAL /HPF
BACTERIA UR CULT: NORMAL
BASOPHILS # BLD AUTO: 29 CELLS/UL (ref 0–200)
BASOPHILS NFR BLD AUTO: 0.4 %
BILIRUB SERPL-MCNC: 0.6 MG/DL (ref 0.2–1.2)
BILIRUB UR QL STRIP: NEGATIVE
BUN SERPL-MCNC: 15 MG/DL (ref 7–25)
BUN/CREAT SERPL: ABNORMAL (CALC) (ref 6–22)
CALCIUM SERPL-MCNC: 8.7 MG/DL (ref 8.6–10.3)
CHLORIDE SERPL-SCNC: 106 MMOL/L (ref 98–110)
CHOLEST SERPL-MCNC: 128 MG/DL
CHOLEST/HDLC SERPL: 3.4 (CALC)
CO2 SERPL-SCNC: 32 MMOL/L (ref 20–32)
COLOR UR: YELLOW
CREAT SERPL-MCNC: 0.86 MG/DL (ref 0.7–1.18)
EOSINOPHIL # BLD AUTO: 180 CELLS/UL (ref 15–500)
EOSINOPHIL NFR BLD AUTO: 2.5 %
ERYTHROCYTE [DISTWIDTH] IN BLOOD BY AUTOMATED COUNT: 12 % (ref 11–15)
ERYTHROCYTE [SEDIMENTATION RATE] IN BLOOD BY WESTERGREN METHOD: 14 MM/H
GLOBULIN SER CALC-MCNC: 3 G/DL (CALC) (ref 1.9–3.7)
GLUCOSE SERPL-MCNC: 101 MG/DL (ref 65–99)
GLUCOSE UR QL STRIP: NEGATIVE
HCT VFR BLD AUTO: 39.9 % (ref 38.5–50)
HDLC SERPL-MCNC: 38 MG/DL
HGB BLD-MCNC: 12.8 G/DL (ref 13.2–17.1)
HGB UR QL STRIP: NEGATIVE
HYALINE CASTS #/AREA URNS LPF: NORMAL /LPF
KETONES UR QL STRIP: NEGATIVE
LDLC SERPL CALC-MCNC: 75 MG/DL (CALC)
LEUKOCYTE ESTERASE UR QL STRIP: NEGATIVE
LYMPHOCYTES # BLD AUTO: 1238 CELLS/UL (ref 850–3900)
LYMPHOCYTES NFR BLD AUTO: 17.2 %
MCH RBC QN AUTO: 30.3 PG (ref 27–33)
MCHC RBC AUTO-ENTMCNC: 32.1 G/DL (ref 32–36)
MCV RBC AUTO: 94.3 FL (ref 80–100)
MONOCYTES # BLD AUTO: 706 CELLS/UL (ref 200–950)
MONOCYTES NFR BLD AUTO: 9.8 %
NEUTROPHILS # BLD AUTO: 5047 CELLS/UL (ref 1500–7800)
NEUTROPHILS NFR BLD AUTO: 70.1 %
NITRITE UR QL STRIP: NEGATIVE
NONHDLC SERPL-MCNC: 90 MG/DL (CALC)
PH UR STRIP: 7 [PH] (ref 5–8)
PLATELET # BLD AUTO: 258 THOUSAND/UL (ref 140–400)
PMV BLD REES-ECKER: 9.7 FL (ref 7.5–12.5)
POTASSIUM SERPL-SCNC: 4.7 MMOL/L (ref 3.5–5.3)
PROT SERPL-MCNC: 6.7 G/DL (ref 6.1–8.1)
PROT UR QL STRIP: NEGATIVE
QUEST EGFR NON-AFR. AMERICAN: 82 ML/MIN/1.73M2
RBC # BLD AUTO: 4.23 MILLION/UL (ref 4.2–5.8)
RBC #/AREA URNS HPF: NORMAL /HPF
SODIUM SERPL-SCNC: 142 MMOL/L (ref 135–146)
SP GR UR STRIP: 1.02 (ref 1–1.03)
SQUAMOUS #/AREA URNS HPF: NORMAL /HPF
TRIGL SERPL-MCNC: 66 MG/DL
TSH SERPL-ACNC: 3.33 MIU/L (ref 0.4–4.5)
WBC # BLD AUTO: 7.2 THOUSAND/UL (ref 3.8–10.8)
WBC #/AREA URNS HPF: NORMAL /HPF

## 2020-01-28 ENCOUNTER — TELEPHONE (OUTPATIENT)
Dept: PRIMARY CARE | Facility: CLINIC | Age: 80
End: 2020-01-28

## 2020-01-28 NOTE — TELEPHONE ENCOUNTER
S/W pt, he just informed me that this is an ongoing issue with him. His previous primary care doctor worked him up for this issue already. He states they did not find anything of concern. He wishes to just repeat the CBC in 6-8 weeks and does not want any further testing at this time.

## 2020-01-28 NOTE — TELEPHONE ENCOUNTER
For starters, I recommend he check a stool card. If possible he should totally avoid NSAIDs for his pain. Thnx

## 2020-01-28 NOTE — TELEPHONE ENCOUNTER
S/W pt, he is having some diarrhea that has been going on for about 2 months, 1-2 times weekly. He has not had changes in his diet or otherwise that he can think of to bring this on. He denies abd pain/bloating, black or bloody stools. Do you have other recommendations for him with this concern? Informed of repeat CBC in 6-8 weeks.

## 2020-01-28 NOTE — TELEPHONE ENCOUNTER
----- Message from Christine Colorado MD sent at 1/28/2020  6:19 AM EST -----  Please contact the pt that his labs are all fine except the hemoglobin is slightly lower than his usual baseline. Uncertain why. It may be nothing. Recommend, as long as he has no GI symptoms, that we repeat a CBC in about 6 to 8 weeks. Thx

## 2020-02-04 ENCOUNTER — HOSPITAL ENCOUNTER (OUTPATIENT)
Dept: PULMONOLOGY | Facility: HOSPITAL | Age: 80
Discharge: HOME | End: 2020-02-04
Attending: INTERNAL MEDICINE
Payer: COMMERCIAL

## 2020-02-04 ENCOUNTER — HOSPITAL ENCOUNTER (OUTPATIENT)
Dept: RADIOLOGY | Age: 80
Discharge: HOME | End: 2020-02-04
Attending: INTERNAL MEDICINE
Payer: COMMERCIAL

## 2020-02-04 DIAGNOSIS — R06.02 SHORTNESS OF BREATH: ICD-10-CM

## 2020-02-04 PROCEDURE — 25000000 HC PHARMACY GENERAL

## 2020-02-04 PROCEDURE — 94726 PLETHYSMOGRAPHY LUNG VOLUMES: CPT

## 2020-02-04 PROCEDURE — 94729 DIFFUSING CAPACITY: CPT

## 2020-02-04 PROCEDURE — 71046 X-RAY EXAM CHEST 2 VIEWS: CPT

## 2020-02-04 PROCEDURE — 94060 EVALUATION OF WHEEZING: CPT

## 2020-02-04 RX ORDER — ALBUTEROL SULFATE 0.83 MG/ML
2.5 SOLUTION RESPIRATORY (INHALATION) ONCE
Status: COMPLETED | OUTPATIENT
Start: 2020-02-04 | End: 2020-02-04

## 2020-02-04 RX ADMIN — ALBUTEROL SULFATE 2.5 MG: 2.5 SOLUTION RESPIRATORY (INHALATION) at 14:38

## 2020-02-10 ENCOUNTER — TELEPHONE (OUTPATIENT)
Dept: PRIMARY CARE | Facility: CLINIC | Age: 80
End: 2020-02-10

## 2020-02-10 NOTE — TELEPHONE ENCOUNTER
SW pt he is aware of results and said he was no better on inhaler. He is aware of the need for an appointment he wanted to call back and make an appointment

## 2020-02-10 NOTE — TELEPHONE ENCOUNTER
----- Message from Christine Colorado MD sent at 2/10/2020  7:34 AM EST -----  Please inform the pt that his CXR is clear. The pulmonary function tests show he may have mild asthma. Ask if the pt could feel a difference when given the inhaler and repeated the test. We could consider giving him an inhaler to try and if he is int  erested, ask that he come in to discuss. Thx

## 2020-02-21 ENCOUNTER — TRANSCRIBE ORDERS (OUTPATIENT)
Dept: SCHEDULING | Age: 80
End: 2020-02-21

## 2020-02-21 DIAGNOSIS — Z96.651 PRESENCE OF RIGHT ARTIFICIAL KNEE JOINT: Primary | ICD-10-CM

## 2020-02-21 DIAGNOSIS — M25.561 PAIN IN RIGHT KNEE: ICD-10-CM

## 2020-02-25 ENCOUNTER — HOSPITAL ENCOUNTER (OUTPATIENT)
Dept: RADIOLOGY | Age: 80
Discharge: HOME | End: 2020-02-25
Attending: ORTHOPAEDIC SURGERY
Payer: COMMERCIAL

## 2020-02-25 DIAGNOSIS — M25.561 PAIN IN RIGHT KNEE: ICD-10-CM

## 2020-02-25 DIAGNOSIS — Z96.651 PRESENCE OF RIGHT ARTIFICIAL KNEE JOINT: ICD-10-CM

## 2020-02-26 ENCOUNTER — TELEPHONE (OUTPATIENT)
Dept: FAMILY MEDICINE | Facility: CLINIC | Age: 80
End: 2020-02-26

## 2020-02-26 NOTE — TELEPHONE ENCOUNTER
Patient has an appointment scheduled for 3-26-20 and wanted to get blood work done.  I told patient he had an open order for lab work from 1-16-20.  Is this okay?, or should other lab work be ordered?    I told patient I would mail the order to him.

## 2020-03-25 ENCOUNTER — TELEPHONE (OUTPATIENT)
Dept: PRIMARY CARE | Facility: CLINIC | Age: 80
End: 2020-03-25

## 2020-03-25 NOTE — TELEPHONE ENCOUNTER
Pt RUDY had a PFT done in 02/10/2020 stated and they sent a swab for an Alpha 1 test and he wanted to know the results advised him I can not see them advised him to call pulm and ask them for results

## 2020-06-26 LAB
ALBUMIN SERPL-MCNC: 4.3 G/DL (ref 3.6–5.1)
ALBUMIN/GLOB SERPL: 1.6 (CALC) (ref 1–2.5)
ALP SERPL-CCNC: 76 U/L (ref 35–144)
ALT SERPL-CCNC: 18 U/L (ref 9–46)
APPEARANCE UR: CLEAR
AST SERPL-CCNC: 25 U/L (ref 10–35)
BACTERIA #/AREA URNS HPF: NORMAL /HPF
BACTERIA UR CULT: NORMAL
BASOPHILS # BLD AUTO: 32 CELLS/UL (ref 0–200)
BASOPHILS NFR BLD AUTO: 0.5 %
BILIRUB SERPL-MCNC: 0.9 MG/DL (ref 0.2–1.2)
BILIRUB UR QL STRIP: NEGATIVE
BUN SERPL-MCNC: 21 MG/DL (ref 7–25)
BUN/CREAT SERPL: NORMAL (CALC) (ref 6–22)
CALCIUM SERPL-MCNC: 8.9 MG/DL (ref 8.6–10.3)
CHLORIDE SERPL-SCNC: 103 MMOL/L (ref 98–110)
CHOLEST SERPL-MCNC: 149 MG/DL
CHOLEST/HDLC SERPL: 3.2 (CALC)
CO2 SERPL-SCNC: 31 MMOL/L (ref 20–32)
COLOR UR: YELLOW
CREAT SERPL-MCNC: 0.87 MG/DL (ref 0.7–1.11)
EOSINOPHIL # BLD AUTO: 160 CELLS/UL (ref 15–500)
EOSINOPHIL NFR BLD AUTO: 2.5 %
ERYTHROCYTE [DISTWIDTH] IN BLOOD BY AUTOMATED COUNT: 12.5 % (ref 11–15)
ERYTHROCYTE [SEDIMENTATION RATE] IN BLOOD BY WESTERGREN METHOD: 19 MM/H
GLOBULIN SER CALC-MCNC: 2.7 G/DL (CALC) (ref 1.9–3.7)
GLUCOSE SERPL-MCNC: 99 MG/DL (ref 65–99)
GLUCOSE UR QL STRIP: NEGATIVE
HCT VFR BLD AUTO: 41.5 % (ref 38.5–50)
HDLC SERPL-MCNC: 46 MG/DL
HGB BLD-MCNC: 13.4 G/DL (ref 13.2–17.1)
HGB UR QL STRIP: NEGATIVE
HYALINE CASTS #/AREA URNS LPF: NORMAL /LPF
KETONES UR QL STRIP: NEGATIVE
LDLC SERPL CALC-MCNC: 87 MG/DL (CALC)
LEUKOCYTE ESTERASE UR QL STRIP: NEGATIVE
LYMPHOCYTES # BLD AUTO: 1178 CELLS/UL (ref 850–3900)
LYMPHOCYTES NFR BLD AUTO: 18.4 %
MCH RBC QN AUTO: 30.9 PG (ref 27–33)
MCHC RBC AUTO-ENTMCNC: 32.3 G/DL (ref 32–36)
MCV RBC AUTO: 95.6 FL (ref 80–100)
MONOCYTES # BLD AUTO: 813 CELLS/UL (ref 200–950)
MONOCYTES NFR BLD AUTO: 12.7 %
NEUTROPHILS # BLD AUTO: 4218 CELLS/UL (ref 1500–7800)
NEUTROPHILS NFR BLD AUTO: 65.9 %
NITRITE UR QL STRIP: NEGATIVE
NONHDLC SERPL-MCNC: 103 MG/DL (CALC)
PH UR STRIP: 5.5 [PH] (ref 5–8)
PLATELET # BLD AUTO: 262 THOUSAND/UL (ref 140–400)
PMV BLD REES-ECKER: 9.6 FL (ref 7.5–12.5)
POTASSIUM SERPL-SCNC: 4.6 MMOL/L (ref 3.5–5.3)
PROT SERPL-MCNC: 7 G/DL (ref 6.1–8.1)
PROT UR QL STRIP: NEGATIVE
QUEST EGFR NON-AFR. AMERICAN: 82 ML/MIN/1.73M2
RBC # BLD AUTO: 4.34 MILLION/UL (ref 4.2–5.8)
RBC #/AREA URNS HPF: NORMAL /HPF
SODIUM SERPL-SCNC: 139 MMOL/L (ref 135–146)
SP GR UR STRIP: 1.02 (ref 1–1.03)
SQUAMOUS #/AREA URNS HPF: NORMAL /HPF
TRIGL SERPL-MCNC: 72 MG/DL
TSH SERPL-ACNC: 2.63 MIU/L (ref 0.4–4.5)
WBC # BLD AUTO: 6.4 THOUSAND/UL (ref 3.8–10.8)
WBC #/AREA URNS HPF: NORMAL /HPF

## 2020-07-20 ENCOUNTER — OFFICE VISIT (OUTPATIENT)
Dept: PRIMARY CARE | Facility: CLINIC | Age: 80
End: 2020-07-20
Payer: COMMERCIAL

## 2020-07-20 VITALS
HEART RATE: 60 BPM | RESPIRATION RATE: 16 BRPM | SYSTOLIC BLOOD PRESSURE: 122 MMHG | DIASTOLIC BLOOD PRESSURE: 60 MMHG | WEIGHT: 163 LBS | HEIGHT: 66 IN | BODY MASS INDEX: 26.2 KG/M2 | OXYGEN SATURATION: 97 %

## 2020-07-20 DIAGNOSIS — H53.9 VISUAL DISTURBANCE: ICD-10-CM

## 2020-07-20 DIAGNOSIS — R10.32 LEFT GROIN PAIN: ICD-10-CM

## 2020-07-20 DIAGNOSIS — M25.561 CHRONIC PAIN OF RIGHT KNEE: ICD-10-CM

## 2020-07-20 DIAGNOSIS — J45.30 MILD PERSISTENT ASTHMA WITHOUT COMPLICATION: Primary | ICD-10-CM

## 2020-07-20 DIAGNOSIS — G89.29 CHRONIC PAIN OF RIGHT KNEE: ICD-10-CM

## 2020-07-20 DIAGNOSIS — M89.49 OTHER HYPERTROPHIC OSTEOARTHROPATHY, MULTIPLE SITES: ICD-10-CM

## 2020-07-20 DIAGNOSIS — I48.0 PAROXYSMAL ATRIAL FIBRILLATION (CMS/HCC): ICD-10-CM

## 2020-07-20 PROCEDURE — 99215 OFFICE O/P EST HI 40 MIN: CPT | Performed by: INTERNAL MEDICINE

## 2020-07-20 RX ORDER — ALBUTEROL SULFATE 90 UG/1
2 INHALANT RESPIRATORY (INHALATION) EVERY 6 HOURS PRN
Qty: 1 INHALER | Refills: 1 | Status: SHIPPED | OUTPATIENT
Start: 2020-07-20 | End: 2022-04-15 | Stop reason: ALTCHOICE

## 2020-07-20 NOTE — PROGRESS NOTES
[facility]  Dr. Christine Colorado  [address], [facility] [address]  [place] [address]  Phone: [phone]  Fax: [phone]      Patient ID: Bertrand Maher Jr.                              : 1940    Visit Date: 2020    Chief Complaint: Follow-up      HPI  Holding up pretty well.  Left groin pain from doing yard work perhaps lifting things too heavy he admits. No bulge at the site.  Can get it with just sitting and then it will relax in the same sitting. Not helped with stretching out supine.  The pain is not associated with eating.  Told Dr. Cortes that he was short of breath not with walking on the level or exerting himself with tasks, but specifically with climbing hills or steps. Dr. Cortes told him his heart was fine.  Has mild asthma he thinks but notes he tried inhalers in the past without benefit.  Rocking feeling of dizziness in February. Has not returned.  His eye doctor, Husam, told him his vision change was from his brain. Offered no suggestion for follow up, he states. Difficult to describe what he sees as no symptoms today.      Subjective      Patient ID: Bertrand Maher Jr. is a 80 y.o. male.    Patient Active Problem List   Diagnosis   • Allergic rhinitis   • Anemia   • Asthma   • BPH (benign prostatic hyperplasia)   • Cervical spondylosis   • Coronary artery stenosis   • Lumbar spondylosis   • History of knee replacement   • Osteoarthritis, hand   • Osteoarthritis of hip   • Other hypertrophic osteoarthropathy, multiple sites   • Atrial fibrillation (CMS/HCC)   • Status post angioplasty   • Hypercholesterolemia   • Urge incontinence   • Injury of left knee   • Shortness of breath   • Mucositis   • Elevated PSA   • Neoplasm of uncertain behavior of skin   • Chronic pain of right knee   • Left groin pain   • Visual disturbance         Current Outpatient Medications:   •  docosahexaenoic acid-epa 120-180 mg capsule, Take 1,000 mg by mouth daily.,  Disp: , Rfl:   •  montelukast (SINGULAIR) 10 mg tablet, Take 1 tablet (10 mg total) by mouth nightly., Disp: 90 tablet, Rfl: 3  •  multivitamin tablet, Take by mouth daily., Disp: , Rfl:   •  omega-3 acid ethyl esters (LOVAZA) 1 gram capsule, Take 1,000 mg by mouth daily., Disp: , Rfl:   •  rosuvastatin (CRESTOR) 40 mg tablet, Take 1 tablet (40 mg total) by mouth once daily., Disp: 90 tablet, Rfl: 3  •  sotalol (BETAPACE) 80 mg tablet, Take 80 mg by mouth 2 times daily., Disp: , Rfl:   •  tamsulosin (FLOMAX) 0.4 mg capsule, Take 0.4 mg by mouth., Disp: , Rfl:   •  XARELTO 20 mg tablet, TAKE 1 TABLET BY MOUTH EVERY DAY WITH DINNER, Disp: , Rfl: 2  •  albuterol HFA (VENTOLIN HFA) 90 mcg/actuation inhaler, Inhale 2 puffs every 6 (six) hours as needed for wheezing., Disp: 1 Inhaler, Rfl: 1    Allergies   Allergen Reactions   • Apixaban      Other reaction(s): Headache    • Oxycodone Anxiety       Social History     Socioeconomic History   • Marital status:      Spouse name: Not on file   • Number of children: Not on file   • Years of education: Not on file   • Highest education level: Not on file   Occupational History   • Not on file   Social Needs   • Financial resource strain: Not on file   • Food insecurity:     Worry: Not on file     Inability: Not on file   • Transportation needs:     Medical: Not on file     Non-medical: Not on file   Tobacco Use   • Smoking status: Never Smoker   • Smokeless tobacco: Never Used   Substance and Sexual Activity   • Alcohol use: Not on file   • Drug use: Not on file   • Sexual activity: Not on file   Lifestyle   • Physical activity:     Days per week: Not on file     Minutes per session: Not on file   • Stress: Not on file   Relationships   • Social connections:     Talks on phone: Not on file     Gets together: Not on file     Attends Congregational service: Not on file     Active member of club or organization: Not on file     Attends meetings of clubs or organizations: Not on  "file     Relationship status: Not on file   • Intimate partner violence:     Fear of current or ex partner: Not on file     Emotionally abused: Not on file     Physically abused: Not on file     Forced sexual activity: Not on file   Other Topics Concern   • Not on file   Social History Narrative   • Not on file       Health Maintenance   Topic Date Due   • Varicella Vaccines (1 of 2 - 2-dose childhood series) 02/28/1941   • DTaP, Tdap, and Td Vaccines (1 - Tdap) 02/[birth year]/1951   • Medicare Annual Wellness Visit  02/28/1958   • Zoster Vaccine (1 of 2) 02/28/1990   • Annual Falls Risk Screening  01/01/2020   • Influenza Vaccine (1) 08/01/2020   • Pneumococcal (65 years and older)  Completed   • Meningococcal ACWY  Aged Out   • HIB Vaccines  Aged Out   • IPV Vaccines  Aged Out   • HPV Vaccines  Aged Out   • Pneumococcal  Aged Out       Past Medical History:   Diagnosis Date   • Allergic rhinitis    • Anemia    • Asthma    • Atrial fibrillation (CMS/HCC)    • BPH (benign prostatic hyperplasia)    • Cataract    • Cervical spondylosis    • Coronary artery stenosis    • History of knee replacement    • Hypercholesterolemia    • Lumbar spondylosis    • Osteoarthritis of hip    • Osteoarthritis, hand    • Other hypertrophic osteoarthropathy, multiple sites    • Status post angioplasty    • Urge incontinence        The following have been reviewed and updated as appropriate in this visit:  Allergies  Meds  Problems         Review of System  Review of Systems    Objective     Vitals  Vitals:    07/20/20 1335   BP: 122/60   BP Location: Left upper arm   Patient Position: Sitting   Pulse: 60   Resp: 16   SpO2: 97%   Weight: 73.9 kg (163 lb)   Height: 1.676 m (5' 6\")       Body mass index is 26.31 kg/m².    Physical Exam  Physical Exam   Constitutional: He is oriented to person, place, and time. He appears well-nourished. No distress.   HENT:   Head: Normocephalic and atraumatic.   Eyes: Pupils are equal, round, and reactive to " light. Conjunctivae and EOM are normal.   Neck: Normal range of motion. Neck supple. Carotid bruit is not present.   Cardiovascular: Normal rate, regular rhythm and normal heart sounds.   Pulmonary/Chest: Effort normal and breath sounds normal. He has no wheezes. He has no rales.   Abdominal: Soft. Bowel sounds are normal. He exhibits no distension and no mass. There is no tenderness.   Musculoskeletal: He exhibits deformity (hands and knees). He exhibits no edema.   Lymphadenopathy:     He has no cervical adenopathy.   Neurological: He is alert and oriented to person, place, and time. Coordination (stiffness with change in position) abnormal.   Skin: Skin is warm and dry.   Vitals reviewed.          Labs 6/25/2020 CMP, lipids, UA, TSH all normal    Assessment/Plan     Problem List Items Addressed This Visit        Nervous    Chronic pain of right knee    Left groin pain     Most likely muscular in nature.  Pt states the pain is at the surface  Not deep.  Pt given advice on looking for herniation for which he should follow up with general surgeon.            Respiratory    Asthma - Primary     Willing to try albuterol rescue inhaler before walking hills to see if it helps.         Relevant Medications    albuterol HFA (VENTOLIN HFA) 90 mcg/actuation inhaler       Circulatory    Atrial fibrillation (CMS/HCC)       Musculoskeletal    Other hypertrophic osteoarthropathy, multiple sites     Continues to keep moving with pain.  Followed by ortho.            Other    Visual disturbance     Have asked the pt to contact his eye doctor for a name to what she thought was going on. He agrees to do this.           Greater than 50% of the visit time was spent in counseling and coordination of care.  40 minutes.      There are no Patient Instructions on file for this visit.    Return in about 6 months (around 1/20/2021), or if symptoms worsen or fail to improve.      Christine Colorado MD  7/22/2020

## 2020-07-22 PROBLEM — R10.32 LEFT GROIN PAIN: Status: ACTIVE | Noted: 2020-07-22

## 2020-07-22 PROBLEM — H53.9 VISUAL DISTURBANCE: Status: ACTIVE | Noted: 2020-07-22

## 2020-07-22 NOTE — ASSESSMENT & PLAN NOTE
Most likely muscular in nature.  Pt states the pain is at the surface  Not deep.  Pt given advice on looking for herniation for which he should follow up with general surgeon.

## 2020-07-22 NOTE — ASSESSMENT & PLAN NOTE
Have asked the pt to contact his eye doctor for a name to what she thought was going on. He agrees to do this.

## 2020-11-06 ENCOUNTER — APPOINTMENT (OUTPATIENT)
Dept: URBAN - METROPOLITAN AREA CLINIC 200 | Age: 80
Setting detail: DERMATOLOGY
End: 2020-11-11

## 2020-11-06 DIAGNOSIS — L82.0 INFLAMED SEBORRHEIC KERATOSIS: ICD-10-CM

## 2020-11-06 DIAGNOSIS — Z85.828 PERSONAL HISTORY OF OTHER MALIGNANT NEOPLASM OF SKIN: ICD-10-CM

## 2020-11-06 DIAGNOSIS — L57.8 OTHER SKIN CHANGES DUE TO CHRONIC EXPOSURE TO NONIONIZING RADIATION: ICD-10-CM

## 2020-11-06 DIAGNOSIS — L57.0 ACTINIC KERATOSIS: ICD-10-CM

## 2020-11-06 PROBLEM — J30.1 ALLERGIC RHINITIS DUE TO POLLEN: Status: ACTIVE | Noted: 2020-11-06

## 2020-11-06 PROBLEM — L55.1 SUNBURN OF SECOND DEGREE: Status: ACTIVE | Noted: 2020-11-06

## 2020-11-06 PROBLEM — E78.5 HYPERLIPIDEMIA, UNSPECIFIED: Status: ACTIVE | Noted: 2020-11-06

## 2020-11-06 PROCEDURE — OTHER REASSURANCE: OTHER

## 2020-11-06 PROCEDURE — 99213 OFFICE O/P EST LOW 20 MIN: CPT | Mod: 25

## 2020-11-06 PROCEDURE — OTHER MIPS QUALITY: OTHER

## 2020-11-06 PROCEDURE — 17000 DESTRUCT PREMALG LESION: CPT

## 2020-11-06 PROCEDURE — 17003 DESTRUCT PREMALG LES 2-14: CPT

## 2020-11-06 PROCEDURE — OTHER LIQUID NITROGEN: OTHER

## 2020-11-06 PROCEDURE — OTHER COUNSELING: OTHER

## 2020-11-06 ASSESSMENT — LOCATION SIMPLE DESCRIPTION DERM
LOCATION SIMPLE: CHEST
LOCATION SIMPLE: LEFT TEMPLE
LOCATION SIMPLE: RIGHT TEMPLE
LOCATION SIMPLE: LEFT CHEEK
LOCATION SIMPLE: RIGHT FOREARM
LOCATION SIMPLE: LEFT ANTERIOR NECK
LOCATION SIMPLE: RIGHT CALF

## 2020-11-06 ASSESSMENT — LOCATION DETAILED DESCRIPTION DERM
LOCATION DETAILED: RIGHT DISTAL RADIAL DORSAL FOREARM
LOCATION DETAILED: LEFT INFERIOR ANTERIOR NECK
LOCATION DETAILED: LEFT MID TEMPLE
LOCATION DETAILED: RIGHT DISTAL DORSAL FOREARM
LOCATION DETAILED: RIGHT MID TEMPLE
LOCATION DETAILED: RIGHT PROXIMAL CALF
LOCATION DETAILED: LEFT MEDIAL SUPERIOR CHEST
LOCATION DETAILED: LEFT SUPERIOR MEDIAL MALAR CHEEK

## 2020-11-06 ASSESSMENT — LOCATION ZONE DERM
LOCATION ZONE: LEG
LOCATION ZONE: NECK
LOCATION ZONE: FACE
LOCATION ZONE: ARM
LOCATION ZONE: TRUNK

## 2020-11-06 ASSESSMENT — PAIN INTENSITY VAS: HOW INTENSE IS YOUR PAIN 0 BEING NO PAIN, 10 BEING THE MOST SEVERE PAIN POSSIBLE?: NO PAIN

## 2020-11-06 NOTE — PROCEDURE: LIQUID NITROGEN
Detail Level: Detailed
Number Of Freeze-Thaw Cycles: 1 freeze-thaw cycle
Render Note In Bullet Format When Appropriate: No
Consent: The patient's consent was obtained including but not limited to risks of crusting, scabbing, blistering, scarring, darker or lighter pigmentary change, recurrence, incomplete removal and infection.
Post-Care Instructions: I reviewed with the patient in detail post-care instructions. Patient is to wear sunprotection, and avoid picking at any of the treated lesions. Pt may apply Vaseline to crusted or scabbing areas.
Duration Of Freeze Thaw-Cycle (Seconds): 2

## 2021-01-29 ENCOUNTER — IMMUNIZATION (OUTPATIENT)
Dept: IMMUNIZATION | Facility: CLINIC | Age: 81
End: 2021-01-29

## 2021-03-04 ENCOUNTER — IMMUNIZATION (OUTPATIENT)
Dept: IMMUNIZATION | Facility: CLINIC | Age: 81
End: 2021-03-04
Attending: FAMILY MEDICINE

## 2021-11-05 ENCOUNTER — APPOINTMENT (OUTPATIENT)
Dept: URBAN - METROPOLITAN AREA CLINIC 200 | Age: 81
Setting detail: DERMATOLOGY
End: 2021-11-08

## 2021-11-05 DIAGNOSIS — L82.0 INFLAMED SEBORRHEIC KERATOSIS: ICD-10-CM

## 2021-11-05 DIAGNOSIS — L57.8 OTHER SKIN CHANGES DUE TO CHRONIC EXPOSURE TO NONIONIZING RADIATION: ICD-10-CM

## 2021-11-05 DIAGNOSIS — Z11.52 ENCOUNTER FOR SCREENING FOR COVID-19: ICD-10-CM

## 2021-11-05 DIAGNOSIS — L57.0 ACTINIC KERATOSIS: ICD-10-CM

## 2021-11-05 PROCEDURE — OTHER SCREENING FOR COVID-19: OTHER

## 2021-11-05 PROCEDURE — 99213 OFFICE O/P EST LOW 20 MIN: CPT | Mod: 25

## 2021-11-05 PROCEDURE — 17003 DESTRUCT PREMALG LES 2-14: CPT

## 2021-11-05 PROCEDURE — OTHER REASSURANCE: OTHER

## 2021-11-05 PROCEDURE — OTHER SUNSCREEN RECOMMENDATIONS: OTHER

## 2021-11-05 PROCEDURE — OTHER LIQUID NITROGEN: OTHER

## 2021-11-05 PROCEDURE — OTHER COUNSELING: OTHER

## 2021-11-05 PROCEDURE — OTHER MIPS QUALITY: OTHER

## 2021-11-05 PROCEDURE — 17000 DESTRUCT PREMALG LESION: CPT

## 2021-11-05 ASSESSMENT — LOCATION DETAILED DESCRIPTION DERM
LOCATION DETAILED: LEFT PROXIMAL DORSAL FOREARM
LOCATION DETAILED: PERIUMBILICAL SKIN
LOCATION DETAILED: RIGHT PROXIMAL DORSAL FOREARM
LOCATION DETAILED: RIGHT MEDIAL SUPERIOR CHEST
LOCATION DETAILED: RIGHT DISTAL DORSAL FOREARM

## 2021-11-05 ASSESSMENT — LOCATION ZONE DERM
LOCATION ZONE: ARM
LOCATION ZONE: TRUNK

## 2021-11-05 ASSESSMENT — LOCATION SIMPLE DESCRIPTION DERM
LOCATION SIMPLE: LEFT FOREARM
LOCATION SIMPLE: ABDOMEN
LOCATION SIMPLE: RIGHT FOREARM
LOCATION SIMPLE: CHEST

## 2021-11-05 NOTE — PROCEDURE: MIPS QUALITY
Quality 110: Preventive Care And Screening: Influenza Immunization: Influenza Immunization previously received during influenza season
Detail Level: Detailed
107

## 2021-11-05 NOTE — PROCEDURE: SCREENING FOR COVID-19
Previous Infection Text: The patient has recovered from a previous COVID-19 infection.
Has The Patient Been Infected With Covid-19 In The Past?: No
Detail Level: Generalized
mother

## 2021-11-05 NOTE — PROCEDURE: LIQUID NITROGEN
Detail Level: Detailed
Number Of Freeze-Thaw Cycles: 1 freeze-thaw cycle
Render Note In Bullet Format When Appropriate: No
Show Applicator Variable?: Yes
Post-Care Instructions: I reviewed with the patient in detail post-care instructions. Patient is to wear sunprotection, and avoid picking at any of the treated lesions. Pt may apply Vaseline to crusted or scabbing areas.
Consent: The patient's consent was obtained including but not limited to risks of crusting, scabbing, blistering, scarring, darker or lighter pigmentary change, recurrence, incomplete removal and infection.
Duration Of Freeze Thaw-Cycle (Seconds): 2

## 2022-04-11 ENCOUNTER — TRANSCRIBE ORDERS (OUTPATIENT)
Dept: SCHEDULING | Age: 82
End: 2022-04-11

## 2022-04-11 DIAGNOSIS — R97.20 ELEVATED PROSTATE SPECIFIC ANTIGEN (PSA): Primary | ICD-10-CM

## 2022-04-19 ENCOUNTER — HOSPITAL ENCOUNTER (OUTPATIENT)
Dept: RADIOLOGY | Age: 82
Discharge: HOME | End: 2022-04-19
Attending: UROLOGY
Payer: COMMERCIAL

## 2022-04-19 DIAGNOSIS — R97.20 ELEVATED PROSTATE SPECIFIC ANTIGEN (PSA): ICD-10-CM

## 2022-04-19 RX ORDER — GADOBUTROL 604.72 MG/ML
7.5 INJECTION INTRAVENOUS ONCE
Status: COMPLETED | OUTPATIENT
Start: 2022-04-19 | End: 2022-04-19

## 2022-04-19 RX ADMIN — GADOBUTROL 7.5 ML: 604.72 INJECTION INTRAVENOUS at 09:14

## 2022-10-31 ENCOUNTER — HOSPITAL ENCOUNTER (OUTPATIENT)
Dept: RADIOLOGY | Facility: CLINIC | Age: 82
Discharge: HOME | End: 2022-10-31
Attending: UROLOGY
Payer: COMMERCIAL

## 2022-10-31 DIAGNOSIS — R93.5 ABNORMAL FINDINGS ON DIAGNOSTIC IMAGING OF OTHER ABDOMINAL REGIONS, INCLUDING RETROPERITONEUM: ICD-10-CM

## 2022-11-10 ENCOUNTER — APPOINTMENT (OUTPATIENT)
Dept: URBAN - METROPOLITAN AREA CLINIC 203 | Age: 82
Setting detail: DERMATOLOGY
End: 2022-11-11

## 2022-11-10 DIAGNOSIS — Z11.52 ENCOUNTER FOR SCREENING FOR COVID-19: ICD-10-CM

## 2022-11-10 DIAGNOSIS — L57.0 ACTINIC KERATOSIS: ICD-10-CM

## 2022-11-10 DIAGNOSIS — Z85.828 PERSONAL HISTORY OF OTHER MALIGNANT NEOPLASM OF SKIN: ICD-10-CM

## 2022-11-10 DIAGNOSIS — L57.8 OTHER SKIN CHANGES DUE TO CHRONIC EXPOSURE TO NONIONIZING RADIATION: ICD-10-CM

## 2022-11-10 PROCEDURE — 17003 DESTRUCT PREMALG LES 2-14: CPT

## 2022-11-10 PROCEDURE — 17000 DESTRUCT PREMALG LESION: CPT

## 2022-11-10 PROCEDURE — OTHER SCREENING FOR COVID-19: OTHER

## 2022-11-10 PROCEDURE — OTHER COUNSELING: OTHER

## 2022-11-10 PROCEDURE — OTHER SUNSCREEN RECOMMENDATIONS: OTHER

## 2022-11-10 PROCEDURE — 99213 OFFICE O/P EST LOW 20 MIN: CPT | Mod: 25

## 2022-11-10 PROCEDURE — OTHER LIQUID NITROGEN: OTHER

## 2022-11-10 ASSESSMENT — LOCATION SIMPLE DESCRIPTION DERM
LOCATION SIMPLE: LEFT PRETIBIAL REGION
LOCATION SIMPLE: LEFT CHEEK
LOCATION SIMPLE: CHEST
LOCATION SIMPLE: RIGHT FOREARM
LOCATION SIMPLE: ABDOMEN
LOCATION SIMPLE: RIGHT ZYGOMA
LOCATION SIMPLE: LEFT UPPER BACK
LOCATION SIMPLE: RIGHT CHEEK

## 2022-11-10 ASSESSMENT — LOCATION DETAILED DESCRIPTION DERM
LOCATION DETAILED: LEFT PROXIMAL PRETIBIAL REGION
LOCATION DETAILED: LEFT SUPERIOR MEDIAL MALAR CHEEK
LOCATION DETAILED: RIGHT CENTRAL ZYGOMA
LOCATION DETAILED: LEFT LATERAL UPPER BACK
LOCATION DETAILED: LEFT MEDIAL INFERIOR CHEST
LOCATION DETAILED: RIGHT SUPERIOR MEDIAL MALAR CHEEK
LOCATION DETAILED: EPIGASTRIC SKIN
LOCATION DETAILED: RIGHT DISTAL DORSAL FOREARM

## 2022-11-10 ASSESSMENT — LOCATION ZONE DERM
LOCATION ZONE: FACE
LOCATION ZONE: TRUNK
LOCATION ZONE: ARM
LOCATION ZONE: LEG

## 2022-11-10 ASSESSMENT — PAIN INTENSITY VAS: HOW INTENSE IS YOUR PAIN 0 BEING NO PAIN, 10 BEING THE MOST SEVERE PAIN POSSIBLE?: NO PAIN

## 2022-11-10 NOTE — PROCEDURE: LIQUID NITROGEN
Detail Level: Detailed
Show Aperture Variable?: Yes
Consent: The patient's consent was obtained including but not limited to risks of crusting, scabbing, blistering, scarring, darker or lighter pigmentary change, recurrence, incomplete removal and infection.
Post-Care Instructions: I reviewed with the patient in detail post-care instructions. Patient is to wear sunprotection, and avoid picking at any of the treated lesions. Pt may apply Vaseline to crusted or scabbing areas.
Duration Of Freeze Thaw-Cycle (Seconds): 0
Render Post-Care Instructions In Note?: no
Application Tool (Optional): Liquid Nitrogen Sprayer

## 2023-03-24 ENCOUNTER — TRANSCRIBE ORDERS (OUTPATIENT)
Dept: SCHEDULING | Age: 83
End: 2023-03-24

## 2023-03-24 DIAGNOSIS — M25.561 PAIN IN RIGHT KNEE: Primary | ICD-10-CM

## 2023-03-31 ENCOUNTER — HOSPITAL ENCOUNTER (OUTPATIENT)
Dept: CARDIOLOGY | Age: 83
Discharge: HOME | End: 2023-03-31
Attending: ORTHOPAEDIC SURGERY
Payer: COMMERCIAL

## 2023-03-31 DIAGNOSIS — M25.561 PAIN IN RIGHT KNEE: ICD-10-CM

## 2023-03-31 PROCEDURE — 93971 EXTREMITY STUDY: CPT | Mod: RT

## 2024-01-24 ENCOUNTER — APPOINTMENT (OUTPATIENT)
Dept: URBAN - METROPOLITAN AREA CLINIC 203 | Age: 84
Setting detail: DERMATOLOGY
End: 2024-01-26

## 2024-01-24 DIAGNOSIS — Z85.828 PERSONAL HISTORY OF OTHER MALIGNANT NEOPLASM OF SKIN: ICD-10-CM

## 2024-01-24 DIAGNOSIS — L82.0 INFLAMED SEBORRHEIC KERATOSIS: ICD-10-CM

## 2024-01-24 DIAGNOSIS — L57.0 ACTINIC KERATOSIS: ICD-10-CM

## 2024-01-24 DIAGNOSIS — D485 NEOPLASM OF UNCERTAIN BEHAVIOR OF SKIN: ICD-10-CM

## 2024-01-24 DIAGNOSIS — L57.8 OTHER SKIN CHANGES DUE TO CHRONIC EXPOSURE TO NONIONIZING RADIATION: ICD-10-CM

## 2024-01-24 PROBLEM — D48.5 NEOPLASM OF UNCERTAIN BEHAVIOR OF SKIN: Status: ACTIVE | Noted: 2024-01-24

## 2024-01-24 PROCEDURE — OTHER PHOTO-DOCUMENTATION: OTHER

## 2024-01-24 PROCEDURE — 99213 OFFICE O/P EST LOW 20 MIN: CPT | Mod: 25

## 2024-01-24 PROCEDURE — OTHER SUNSCREEN RECOMMENDATIONS: OTHER

## 2024-01-24 PROCEDURE — OTHER LIQUID NITROGEN: OTHER

## 2024-01-24 PROCEDURE — 17000 DESTRUCT PREMALG LESION: CPT | Mod: 59

## 2024-01-24 PROCEDURE — OTHER COUNSELING: OTHER

## 2024-01-24 PROCEDURE — OTHER REASSURANCE: OTHER

## 2024-01-24 PROCEDURE — 11102 TANGNTL BX SKIN SINGLE LES: CPT

## 2024-01-24 PROCEDURE — OTHER BIOPSY BY SHAVE METHOD: OTHER

## 2024-01-24 ASSESSMENT — LOCATION DETAILED DESCRIPTION DERM
LOCATION DETAILED: LEFT INFERIOR LATERAL MALAR CHEEK
LOCATION DETAILED: RIGHT MEDIAL EYEBROW
LOCATION DETAILED: LEFT MEDIAL BREAST 11-12:00 REGION
LOCATION DETAILED: LEFT MEDIAL BREAST 10-11:00 REGION
LOCATION DETAILED: LEFT LATERAL INFERIOR CHEST
LOCATION DETAILED: LEFT PROXIMAL PRETIBIAL REGION
LOCATION DETAILED: EPIGASTRIC SKIN
LOCATION DETAILED: RIGHT LATERAL KNEE
LOCATION DETAILED: RIGHT DORSAL FOOT
LOCATION DETAILED: LEFT INFERIOR ANTERIOR NECK

## 2024-01-24 ASSESSMENT — LOCATION SIMPLE DESCRIPTION DERM
LOCATION SIMPLE: ABDOMEN
LOCATION SIMPLE: RIGHT KNEE
LOCATION SIMPLE: LEFT BREAST
LOCATION SIMPLE: RIGHT FOOT
LOCATION SIMPLE: CHEST
LOCATION SIMPLE: RIGHT EYEBROW
LOCATION SIMPLE: LEFT ANTERIOR NECK
LOCATION SIMPLE: LEFT CHEEK
LOCATION SIMPLE: LEFT PRETIBIAL REGION

## 2024-01-24 ASSESSMENT — LOCATION ZONE DERM
LOCATION ZONE: TRUNK
LOCATION ZONE: NECK
LOCATION ZONE: LEG
LOCATION ZONE: FACE
LOCATION ZONE: FACE
LOCATION ZONE: FEET

## 2024-01-24 NOTE — PROCEDURE: BIOPSY BY SHAVE METHOD
Detail Level: Detailed
Depth Of Biopsy: dermis
Was A Bandage Applied: Yes
Size Of Lesion In Cm: 0
Biopsy Type: H and E
Biopsy Method: Dermablade
Anesthesia Type: 1% lidocaine with epinephrine
Anesthesia Volume In Cc: 0.5
Hemostasis: Drysol
Wound Care: Petrolatum
Dressing: bandage
Destruction After The Procedure: No
Type Of Destruction Used: Curettage
Curettage Text: The wound bed was treated with curettage after the biopsy was performed.
Cryotherapy Text: The wound bed was treated with cryotherapy after the biopsy was performed.
Electrodesiccation Text: The wound bed was treated with electrodesiccation after the biopsy was performed.
Electrodesiccation And Curettage Text: The wound bed was treated with electrodesiccation and curettage after the biopsy was performed.
Silver Nitrate Text: The wound bed was treated with silver nitrate after the biopsy was performed.
Lab: [id number]
Consent: Written consent was obtained and risks were reviewed including but not limited to scarring, infection, bleeding, scabbing, incomplete removal, nerve damage and allergy to anesthesia.
Post-Care Instructions: I reviewed with the patient in detail post-care instructions. Patient is to keep the biopsy site dry overnight, and then apply bacitracin twice daily until healed. Patient may apply hydrogen peroxide soaks to remove any crusting.
Notification Instructions: Patient will be notified of biopsy results. However, patient instructed to call the office if not contacted within 2 weeks.
Billing Type: Third-Party Bill
Information: Selecting Yes will display possible errors in your note based on the variables you have selected. This validation is only offered as a suggestion for you. PLEASE NOTE THAT THE VALIDATION TEXT WILL BE REMOVED WHEN YOU FINALIZE YOUR NOTE. IF YOU WANT TO FAX A PRELIMINARY NOTE YOU WILL NEED TO TOGGLE THIS TO 'NO' IF YOU DO NOT WANT IT IN YOUR FAXED NOTE.

## 2024-01-24 NOTE — PROCEDURE: LIQUID NITROGEN
Consent: The patient's consent was obtained including but not limited to risks of crusting, scabbing, blistering, scarring, darker or lighter pigmentary change, recurrence, incomplete removal and infection.
Render Post-Care Instructions In Note?: no
Show Aperture Variable?: Yes
Duration Of Freeze Thaw-Cycle (Seconds): 5
Application Tool (Optional): Liquid Nitrogen Sprayer
Post-Care Instructions: I reviewed with the patient in detail post-care instructions. Patient is to wear sunprotection, and avoid picking at any of the treated lesions. Pt may apply Vaseline to crusted or scabbing areas.
Number Of Freeze-Thaw Cycles: 2 freeze-thaw cycles
Detail Level: Zone

## 2024-02-28 ENCOUNTER — APPOINTMENT (OUTPATIENT)
Dept: URBAN - METROPOLITAN AREA CLINIC 203 | Age: 84
Setting detail: DERMATOLOGY
End: 2024-02-28

## 2024-02-28 PROBLEM — C44.719 BASAL CELL CARCINOMA OF SKIN OF LEFT LOWER LIMB, INCLUDING HIP: Status: ACTIVE | Noted: 2024-02-28

## 2024-02-28 PROCEDURE — OTHER CURETTAGE AND DESTRUCTION: OTHER

## 2024-02-28 PROCEDURE — 17260 DSTRJ MAL LES T/A/L 0.5 CM/<: CPT

## 2024-02-28 NOTE — PROCEDURE: CURETTAGE AND DESTRUCTION
Detail Level: Detailed
Biopsy Photograph Reviewed: Yes
Number Of Curettages: 3
Size Of Lesion In Cm: 1.3
Size Of Lesion After Curettage: 0
Add Intralesional Injection: No
Concentration (Mg/Ml Or Millions Of Plaque Forming Units/Cc): 0.01
Total Volume (Ccs): 1
Anesthesia Type: 1% lidocaine with epinephrine
Cautery Type: electrodesiccation
What Was Performed First?: Curettage
Final Size Statement: The size of the lesion after treatment was
Additional Information: (Optional): The wound was cleaned, and a pressure dressing was applied.  The patient received detailed post-op instructions.
Consent was obtained from the patient. The risks, benefits and alternatives to therapy were discussed in detail. Specifically, the risks of infection, scarring, bleeding, prolonged wound healing, nerve injury, incomplete removal, allergy to anesthesia and recurrence were addressed. Alternatives to ED&C, such as: surgical removal and XRT were also discussed.  Prior to the procedure, the treatment site was clearly identified and confirmed by the patient. All components of Universal Protocol/PAUSE Rule completed.
Post-Care Instructions: I reviewed with the patient in detail post-care instructions. Patient is to keep the area dry for 48 hours, and not to engage in any swimming until the area is healed. Should the patient develop any fevers, chills, bleeding, severe pain patient will contact the office immediately.
Bill As A Line Item Or As Units: Line Item

## 2024-03-04 ENCOUNTER — TRANSCRIBE ORDERS (OUTPATIENT)
Dept: SCHEDULING | Facility: REHABILITATION | Age: 84
End: 2024-03-04

## 2024-03-04 DIAGNOSIS — Z96.651 PRESENCE OF RIGHT ARTIFICIAL KNEE JOINT: Primary | ICD-10-CM

## 2024-04-02 ENCOUNTER — HOSPITAL ENCOUNTER (OUTPATIENT)
Dept: PHYSICAL THERAPY | Age: 84
Setting detail: THERAPIES SERIES
Discharge: HOME | End: 2024-04-02
Attending: FAMILY MEDICINE
Payer: COMMERCIAL

## 2024-04-02 DIAGNOSIS — M25.561 ACUTE PAIN OF RIGHT KNEE: Primary | ICD-10-CM

## 2024-04-02 DIAGNOSIS — Z96.651 PRESENCE OF RIGHT ARTIFICIAL KNEE JOINT: ICD-10-CM

## 2024-04-02 PROCEDURE — 97161 PT EVAL LOW COMPLEX 20 MIN: CPT | Mod: GP

## 2024-04-02 ASSESSMENT — GAIT ASSESSMENTS: TUG TIME: TBA

## 2024-04-02 NOTE — LETTER
Dear DR. Ibarra,    Thank you for this referral. Please review the attached notes and plan of care for your approval.  Please contact our department with any questions.     Sincerely,     Janet Rodriguez, PT  [address]  [place] [address]  Phone [phone]  Fax  [phone]    By co-signing this Plan of Care (POC) you agree to the following:  I have reviewed the the Plan of Care established by the therapist within this document and certify that the services are skilled and medically necessary. I have reviewed the plan and recommend that these services continue to meet the goals stated in this document.    PHYSICIAN SIGNATURE: __________________________________     DATE: ___________________  TIME: _____________           Physical Therapy Plan of Care 24   Effective from: 2024  Effective to: 2024    Plan ID: [id number]            Participants as of Finalize on 2024    Name Type Comments Contact Info    Bob Ibarra DO PCP - General  [phone]    Janet Rodriguez, PT Physical Therapist         Last Progress Notes Note     Author: Norma Metzger Status: Attested Last edited: 2024 12:00 PM    Attestation signed by Janet Rodriguez PT at 2024  6:08 PM    I attest that I was present for the entire session, directed all activities, made all clinical decisions, and was responsible for all assessments including the review of medications, allergies and history.                 Physical Therapy Evaluation    [facility] Fax: [phone]    PT EVALUATION FOR OUTPATIENT THERAPY    Patient: Tre Maher Jr.    MRN: [id number]  : 1940 84 y.o.     Referring Physician: Bob Ibarra DO  Date of Visit: 2024      Certification Dates:  24 through 24         Recommended Frequency & Duration:  2 times/week for up to 8 weeks     Diagnosis:   1. Acute pain of right knee    2. Presence of right artificial knee joint        Chief Complaints:   Chief  "Complaint   Patient presents with   • Joint Pain   • Difficulty Walking   • Pain   • Abnormality Of Gait   • Decreased Mobility   • Dec Strength   • Dec ROM       Precautions: no known precautions/restrictions  Precautions additional comments:      Past Medical History:   Past Medical History:   Diagnosis Date   • Allergic rhinitis    • Anemia    • Asthma    • Atrial fibrillation (CMS/HCC)    • BPH (benign prostatic hyperplasia)    • Cataract    • Cervical spondylosis    • Coronary artery stenosis    • History of knee replacement    • Hypercholesterolemia    • Lumbar spondylosis    • Osteoarthritis of hip    • Osteoarthritis, hand    • Other hypertrophic osteoarthropathy, multiple sites    • Status post angioplasty    • Urge incontinence        Past Surgical History:   Past Surgical History:   Procedure Laterality Date   • BASAL CELL CARCINOMA EXCISION  08/04/2000   • CARDIAC CATHETERIZATION N/A 10/22/2004   • CARDIAC CATHETERIZATION  08/05/2021   • CARDIAC SURGERY  05/18/2021    \"heart procedure\"   • CORONARY STENT PLACEMENT  10/22/2004    x2   • EYE SURGERY Right 09/21/2017    laser   • REPLACEMENT TOTAL KNEE Left 06/01/1994   • REPLACEMENT TOTAL KNEE Right 03/09/1994   • REVISION TOTAL KNEE ARTHROPLASTY Right 2017    3RD REVISION 2018   • SHOULDER SURGERY Right 01/06/2005   • SHOULDER SURGERY Left 01/12/1989   • TOTAL HIP ARTHROPLASTY Right 2017         LEARNING ASSESSMENT    Assessment completed:  Yes    Learner name:  Tre Santos    Learner: Patient    Learning Barriers:  Learning barriers: No Barriers    Preferred Language: English     Needed: No    Education Provided:   Method: Discussion  Readiness: acceptance  Response: Verbalizes understanding      CO-LEARNER ASSESSMENT:    Completed: No      Welcome letter discussed: Yes Patient provided with Welcome Letter, which includes attendance policy. Provided education regarding cancellation and no-show policy. Education regarding the importance of " participation and regular attendance to maximize goal attainment.       OBJECTIVE MEASUREMENTS/DATA:    Time In Session:  Start Time: 1201  Stop Time: 1249  Time Calculation (min): 48 min   Assessment and Plan - 04/02/24 1159          Assessment    Plan of Care reviewed and patient/family in agreement Yes     System Pathology/Pathophysiology Noted musculoskeletal     Functional Limitations in Following Categories (PT Eval) home management;self-care;community/leisure     Rehab Potential/Prognosis re-evaluate goals as necessary;adequate, monitor progress closely     Problem List decreased ROM;edema;pain;abnormal muscle tone;decreased flexibility;decreased strength     Plan and Recommendations 2x / week for 8 weeks     Planned Services CPT 73548 Manual therapy;CPT 35495 Gait training;CPT 72165 Neuromuscular Reeducation;CPT 73214 Self-care/Home management training;CPT 76921 Therapeutic activities;CPT 01630 Therapeutic exercises;CPT 02274 Therapeutic Massage;CPT 12044 Hot/Cold Packs therapy                    General Information - 04/02/24 1211          General Information    Onset of Illness/Injury or Date of Surgery --   3 months ago    Referring Physician Bob Ibarra     History of present illness/functional impairment Several months ago, injured right knee doing leg press machine, then later, stood on ladder  x 1 hour and seemed to aggravate knee.  Knee had previous been a problem for past several years.   Worse:  first steps after sitting, step to up/down, can do step thru up stairs;  uses furniture around house, uses cane outside home (more recently); wakes from sound sleep with rolling; intermittent pain; trying to kick off shoes in posterior right knee., + bakers cyst     Patient/Family/Caregiver Comments/Observations Will be having CTS lily in 5/ and 6/2024     Existing Precautions/Restrictions no known precautions/restrictions                    Pain/Vitals - 04/02/24 1211          Pain Assessment    Pain  Side/Orientation right     Pain: Body location Knee     Pain Rating (0-10): Pre Activity 0     Pain Rating (0-10): Activity 5     Pain Rating (0-10): Post Activity 5     Pain Description intermittent     Pain Onset/Duration worse with first steps after sitting, stairs, rolling in bed, trying to kick shoes off brings pain in posterior knee, resolves with sitting/rest, occasionally takes tylenol        Pain Intervention    Intervention  N/A     Post Intervention Comments NA                    Falls/Food Screening - 04/02/24 1211          Initial Falls Assessment    One or more falls in the last year Yes     How many times 1     Was the patient injured in any fall No     Fall prevention interventions recommended [place] and re-orient the patient to the environment        Food Insecurity    Within the past 12 months, you worried that your food would run out before you got the money to buy more. Never true     Within the past 12 months, the food you bought just didn't last and you didn't have money to get more. Never true                    PT - 04/02/24 1159          Physical Therapy    Physical Therapy [facility] PT        PT Plan    Frequency of treatment 2 times/week     PT Duration 8 weeks     PT Cert From 04/02/24     PT Cert To 06/01/24     Signed PT Plan of Care received?  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[place];  [employer]; outside activities; goes to [place] 2 x week, but then has had to stop due to knee pain                    ROM    Range of Motion - 04/02/24 1200          RIGHT: Lower Extremity PROM Assessment    Hip Extension  0 degrees     Hip Internal Rotation  5 degrees   medial knee pain    Hip External Rotation  20 degrees   15-20 degrees with pain       RIGHT: Lower Extremity AROM Assessment    Hip Internal Rotation   --     Hip External Rotation   --     Knee Flexion   87   H/L, in supine 90-90 flexion 78    Knee Extension   2   lacking       Additional ROM Measurements    Additional ROM  .                   MMT    Manual Muscle Tests - 04/02/24 1211          RIGHT: Lower Extremity Manual Muscle Test Assessment    Hip Flexion gross movement (3+/5) fair plus   w/ posterior knee pain    Knee Flexion strength (4+/5) good plus     Knee Extension strength (4/5) good   w/ pain    Ankle Dorsiflexion gross movement (5/5) normal     Ankle Plantarflexion gross movement (5/5) normal     Ankle Inversion gross movement (5/5) normal     Ankle Eversion gross movement (5/5) normal                     Gait and Mobility    Gait and Mobility - 04/02/24 1159          Gait Training    Kinney, Gait independent     Gait surface comments --     Deviations/Abnormal Patterns antalgic;right sided deviations;weight shifting decreased;stride length decreased;gait speed decreased;hiral decreased        Stairs Assessment    Assistive Device railing     Handrail Location (Stairs) left side (ascending)     Number of Steps (Stairs) 4     Ascending Stairs Technique step-over-step     Descending Stairs Technique step-to-step     Comment Pushes off R knee with hands while ascending                   Balance/Posture    Balance and Posture - 04/02/24 1211          Postural Deviations    Comment, Postural Deviations overall kyphotic posture, PPT        Posture    Posture postural deviations                   Outcome Measures    PT Outcome Measures - 04/02/24 1159          Objective Outcome Measures    2 Minute Walk Test TBA     10 Meter Walk  Test 0.81 meters/sec     30 Second Sit to Stand Test 7 repetitions   no use of hands    Five Times to Sit to Stand (FTSTS) 22.74   no use of hands       Subjective Outcome Measures    KOOS KOOS Score: 29%  (Symptoms + Stiffness subtotal: 43%;  Pain subtotal: 50%;  Function, daily living subtotal: 49%;  Function, sports and recreational activities subtotal: 5%;  Quality of life subtotal: 0%)        Other Outcome Measures Used/Comments    Other outcome measure used: --        Timed Up and Go Test    Results, Timed Up and Go Test (Balance) TBA                      Goals       •  Mutually agreed upon pain goal       Mutually agreed upon pain goal: <2/10 to perform ADLS.      •  right knee goals (pt-stated)       Pt goal: Strengthen knee so I can stop worrying about it    GOALS TIME FRAME PROGRESS DATE ACHIEVED   KOOS >39% (Ie 29%) 4 WEEKS     KOOS >50% 8 weeks     INCREASE FLEXIBILITY OF hamstrings to allow for TKE 6-8 WEEKS     INCREASE FLEXIBILITY OF hip flexors by 5% to allow for increased pushoff 6-8 Weeks     INCREASE STRENGTH of RLE to WFL TO RETURN TO PLOF 4-8 WEEKS     INCREASE ROM TO 0-105 TO RETURN TO ASSIST IN RETURN TO PLOF 6-8 WEEKS                                                  TREATMENT PLAN:    *G=group; Y=completed; N = not completed; H= held; HEP= has been provided for HEP*    # manual and/or verbal cues applied to ensure proper technique and sequence       DOS    IE 4/2/2024   POC DATES 4/2/24-6/1/24   EXTENDED POC    30 Day 5/2/24   60 Day    90 Day    Precautions    Insurance Auth    Treatment  Current Session Time   Modalities Total Time for Session Not performed   Heat/Ice  CPT 59835 Moist heat pre-activity R knee   E. Stimulation Manual  CPT 88452    E. Stim Unattended  CPT 64353    MECHANICAL TRACTION  CPT 31438    Ultrasound  CPT 68108    Manual   CPT 79594 Total Time for Session Not performed   STM/MFR/TPR STM to R knee for dec pain and inc soft tissue mobility   Instrument Assisted STM      Mobilizations    ROM/Flexibility    Myofascial Decompression    Ther. Exercise  CPT 99877                   Y/N/G Total Time for Session Not performed     Sets Reps Load Comment    PROGRESS NOTE/RE-EVAL/DISCHARGE NOTE ASSESSMENT        Quad sets        Hip ABD         Heel slides                                                Mechanical spine assessment     education     Biomechanical Assessment   PT provided continual visual assessment of mechanical faults throughout program, required manual and verbal cueing to improve joint biomechanics and normalize gross movement patterns.      BFR  BFR: EDUCATION ON INDICATIONS, LIMB CHANGES, POST TREATMENT EFFECTS, AND SIDE EFFECTS.  VERBAL PERMISSION OBTAINED TO PERFORM TREATMENT.    BFR PARAMETERS     Neuro Re-Ed  CPT 99485   Y/N/G Total Time for Session Not performed     Sets Reps Load Comment                           POSTURE EDUCATION  Lumbar biomechanics, derangement sx pattern, centralization vs peripheralization; frequency of FB must be reduced, HEP, use of lumbar roll   POSTURE EDUCATION  Avoid forward head and UCS extension. Pull shoulders back.  Fist example to achieve proper head position/elevation.    Gait  CPT 08501 Y/N/G Total Time for Session Not performed                  Ther. Activity  CPT 07267 Y/N/G Total Time for Session Not performed   EDUCATION          Group  CPT 35491 Total Time for Session Not performed        ASSESSMENT:    This 84 y.o. year old male presents to PT with above stated diagnosis. Physical Therapy evaluation reveals decreased ROM, edema, pain, abnormal muscle tone, decreased flexibility, decreased strength resulting in home management, self-care, community/leisure limitations. Bertrand Maher Jr. will benefit from skilled PT services to address limitation, work towards rehab and patient goals and maximize PLOF of chosen ADLs.     Planned Services: The patient's treatment will include CPT 97754 Manual therapy, CPT 47055 Gait  training, CPT 25434 Neuromuscular Reeducation, CPT 95675 Self-care/Home management training, CPT 55541 Therapeutic activities, CPT 79453 Therapeutic exercises, CPT 64524 Therapeutic Massage, CPT 96926 Hot/Cold Packs therapy, .     Norma Metzger                           Current Participants as of 4/2/2024    Name Type Comments Contact Info    Bob Ibarra DO PCP - General  [phone]    Signature pending    Janet Rodriguez PT Physical Therapist      Electronically signed by Janet Rodriguez PT at 4/2/2024 3650 EDT

## 2024-04-02 NOTE — PROGRESS NOTES
"Physical Therapy Evaluation    [facility] Fax: [phone]    PT EVALUATION FOR OUTPATIENT THERAPY    Patient: Tre Maher Jr.    MRN: [id number]  : 1940 84 y.o.     Referring Physician: Bob Ibarra DO  Date of Visit: 2024      Certification Dates:  24 through 24         Recommended Frequency & Duration:  2 times/week for up to 8 weeks     Diagnosis:   1. Acute pain of right knee    2. Presence of right artificial knee joint        Chief Complaints:   Chief Complaint   Patient presents with    Joint Pain    Difficulty Walking    Pain    Abnormality Of Gait    Decreased Mobility    Dec Strength    Dec ROM       Precautions: no known precautions/restrictions  Precautions additional comments:      Past Medical History:   Past Medical History:   Diagnosis Date    Allergic rhinitis     Anemia     Asthma     Atrial fibrillation (CMS/HCC)     BPH (benign prostatic hyperplasia)     Cataract     Cervical spondylosis     Coronary artery stenosis     History of knee replacement     Hypercholesterolemia     Lumbar spondylosis     Osteoarthritis of hip     Osteoarthritis, hand     Other hypertrophic osteoarthropathy, multiple sites     Status post angioplasty     Urge incontinence        Past Surgical History:   Past Surgical History:   Procedure Laterality Date    BASAL CELL CARCINOMA EXCISION  2000    CARDIAC CATHETERIZATION N/A 10/22/2004    CARDIAC CATHETERIZATION  2021    CARDIAC SURGERY  2021    \"heart procedure\"    CORONARY STENT PLACEMENT  10/22/2004    x2    EYE SURGERY Right 2017    laser    REPLACEMENT TOTAL KNEE Left 1994    REPLACEMENT TOTAL KNEE Right 1994    REVISION TOTAL KNEE ARTHROPLASTY Right 2017    3RD REVISION 2018    SHOULDER SURGERY Right 2005    SHOULDER SURGERY Left 1989    TOTAL HIP ARTHROPLASTY Right 2017         LEARNING ASSESSMENT    Assessment completed:  Yes    Learner name:  Tre Ririnayan    Learner: " Patient    Learning Barriers:  Learning barriers: No Barriers    Preferred Language: English     Needed: No    Education Provided:   Method: Discussion  Readiness: acceptance  Response: Verbalizes understanding      CO-LEARNER ASSESSMENT:    Completed: No      Welcome letter discussed: Yes Patient provided with Welcome Letter, which includes attendance policy. Provided education regarding cancellation and no-show policy. Education regarding the importance of participation and regular attendance to maximize goal attainment.       OBJECTIVE MEASUREMENTS/DATA:    Time In Session:  Start Time: 1201  Stop Time: 1249  Time Calculation (min): 48 min   Assessment and Plan - 04/02/24 1159          Assessment    Plan of Care reviewed and patient/family in agreement Yes     System Pathology/Pathophysiology Noted musculoskeletal     Functional Limitations in Following Categories (PT Eval) home management;self-care;community/leisure     Rehab Potential/Prognosis re-evaluate goals as necessary;adequate, monitor progress closely     Problem List decreased ROM;edema;pain;abnormal muscle tone;decreased flexibility;decreased strength     Plan and Recommendations 2x / week for 8 weeks     Planned Services CPT 34964 Manual therapy;CPT 79537 Gait training;CPT 87712 Neuromuscular Reeducation;CPT 94486 Self-care/Home management training;CPT 10806 Therapeutic activities;CPT 71154 Therapeutic exercises;CPT 08755 Therapeutic Massage;CPT 53131 Hot/Cold Packs therapy                    General Information - 04/02/24 1211          General Information    Onset of Illness/Injury or Date of Surgery --   3 months ago    Referring Physician Bob Ibarra     History of present illness/functional impairment Several months ago, injured right knee doing leg press machine, then later, stood on ladder  x 1 hour and seemed to aggravate knee.  Knee had previous been a problem for past several years.   Worse:  first steps after sitting, step to  up/down, can do step thru up stairs;  uses furniture around house, uses cane outside home (more recently); wakes from sound sleep with rolling; intermittent pain; trying to kick off shoes in posterior right knee., + bakers cyst     Patient/Family/Caregiver Comments/Observations Will be having CTS lily in 5/ and 6/2024     Existing Precautions/Restrictions no known precautions/restrictions                    Pain/Vitals - 04/02/24 1211          Pain Assessment    Pain Side/Orientation right     Pain: Body location Knee     Pain Rating (0-10): Pre Activity 0     Pain Rating (0-10): Activity 5     Pain Rating (0-10): Post Activity 5     Pain Description intermittent     Pain Onset/Duration worse with first steps after sitting, stairs, rolling in bed, trying to kick shoes off brings pain in posterior knee, resolves with sitting/rest, occasionally takes tylenol        Pain Intervention    Intervention  N/A     Post Intervention Comments NA                    Falls/Food Screening - 04/02/24 1211          Initial Falls Assessment    One or more falls in the last year Yes     How many times 1     Was the patient injured in any fall No     Fall prevention interventions recommended [place] and re-orient the patient to the environment        Food Insecurity    Within the past 12 months, you worried that your food would run out before you got the money to buy more. Never true     Within the past 12 months, the food you bought just didn't last and you didn't have money to get more. Never true                    PT - 04/02/24 1159          Physical Therapy    Physical Therapy [facility] PT        PT Plan    Frequency of treatment 2 times/week     PT Duration 8 weeks     PT Cert From 04/02/24     PT Cert To 06/01/24     Signed PT Plan of Care received?  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 ;  wood carving; outside activities; goes to [facility] 2 x week, but then has had to stop due to knee pain                   ROM    Range of Motion - 04/02/24 1200          RIGHT: Lower Extremity PROM Assessment    Hip Extension  0 degrees     Hip Internal Rotation  5 degrees   medial knee pain    Hip External Rotation  20 degrees   15-20 degrees with pain       RIGHT: Lower Extremity AROM Assessment    Hip Internal Rotation   --     Hip External Rotation   --     Knee Flexion   87   H/L, in supine 90-90 flexion 78    Knee Extension   2   lacking       Additional ROM Measurements    Additional ROM  .                   MMT    Manual Muscle Tests - 04/02/24 1211          RIGHT: Lower Extremity Manual Muscle Test Assessment    Hip Flexion gross movement (3+/5) fair plus   w/ posterior knee pain    Knee Flexion strength (4+/5) good plus     Knee Extension strength (4/5) good   w/ pain    Ankle Dorsiflexion gross movement (5/5) normal     Ankle Plantarflexion gross movement (5/5) normal     Ankle Inversion gross movement (5/5) normal     Ankle Eversion gross movement (5/5) normal                     Gait and Mobility    Gait and Mobility - 04/02/24 1159          Gait Training    [place], Gait independent     Gait surface comments --     Deviations/Abnormal Patterns antalgic;right sided deviations;weight shifting decreased;stride length decreased;gait speed decreased;hiral decreased        Stairs Assessment    Assistive Device railing     Handrail Location (Stairs) left side (ascending)     Number of Steps (Stairs) 4     Ascending Stairs Technique  step-over-step     Descending Stairs Technique step-to-step     Comment Pushes off R knee with hands while ascending                   Balance/Posture    Balance and Posture - 04/02/24 1211          Postural Deviations    Comment, Postural Deviations overall kyphotic posture, PPT        Posture    Posture postural deviations                   Outcome Measures    PT Outcome Measures - 04/02/24 1159          Objective Outcome Measures    2 Minute Walk Test TBA     10 Meter Walk Test 0.81 meters/sec     30 Second Sit to Stand Test 7 repetitions   no use of hands    Five Times to Sit to Stand (FTSTS) 22.74   no use of hands       Subjective Outcome Measures    KOOS KOOS Score: 29%  (Symptoms + Stiffness subtotal: 43%;  Pain subtotal: 50%;  Function, daily living subtotal: 49%;  Function, sports and recreational activities subtotal: 5%;  Quality of life subtotal: 0%)        Other Outcome Measures Used/Comments    Other outcome measure used: --        Timed Up and Go Test    Results, Timed Up and Go Test (Balance) TBA                      Goals         Mutually agreed upon pain goal       Mutually agreed upon pain goal: <2/10 to perform ADLS.        right knee goals (pt-stated)       Pt goal: Strengthen knee so I can stop worrying about it    GOALS TIME FRAME PROGRESS DATE ACHIEVED   KOOS >39% (Ie 29%) 4 WEEKS     KOOS >50% 8 weeks     INCREASE FLEXIBILITY OF hamstrings to allow for TKE 6-8 WEEKS     INCREASE FLEXIBILITY OF hip flexors by 5% to allow for increased pushoff 6-8 Weeks     INCREASE STRENGTH of RLE to WFL TO RETURN TO PLOF 4-8 WEEKS     INCREASE ROM TO 0-105 TO RETURN TO ASSIST IN RETURN TO PLOF 6-8 WEEKS                                                  TREATMENT PLAN:    *G=group; Y=completed; N = not completed; H= held; HEP= has been provided for HEP*    # manual and/or verbal cues applied to ensure proper technique and sequence       DOS    IE 4/2/2024   POC DATES 4/2/24-6/1/24   EXTENDED POC    30 Day  5/2/24   60 Day    90 Day    Precautions    Insurance Auth    Treatment  Current Session Time   Modalities Total Time for Session Not performed   Heat/Ice  CPT 80900 Moist heat pre-activity R knee   E. Stimulation Manual  CPT 51138    E. Stim Unattended  CPT 77329    MECHANICAL TRACTION  CPT 39211    Ultrasound  CPT 45478    Manual   CPT 01774 Total Time for Session Not performed   STM/MFR/TPR STM to R knee for dec pain and inc soft tissue mobility   Instrument Assisted STM     Mobilizations    ROM/Flexibility    Myofascial Decompression    Ther. Exercise  CPT 28245                   Y/N/G Total Time for Session Not performed     Sets Reps Load Comment    PROGRESS NOTE/RE-EVAL/DISCHARGE NOTE ASSESSMENT        Quad sets        Hip ABD         Heel slides                                                Mechanical spine assessment     education     Biomechanical Assessment   PT provided continual visual assessment of mechanical faults throughout program, required manual and verbal cueing to improve joint biomechanics and normalize gross movement patterns.      BFR  BFR: EDUCATION ON INDICATIONS, LIMB CHANGES, POST TREATMENT EFFECTS, AND SIDE EFFECTS.  VERBAL PERMISSION OBTAINED TO PERFORM TREATMENT.    BFR PARAMETERS     Neuro Re-Ed  CPT 38167   Y/N/G Total Time for Session Not performed     Sets Reps Load Comment                           POSTURE EDUCATION  Lumbar biomechanics, derangement sx pattern, centralization vs peripheralization; frequency of FB must be reduced, HEP, use of lumbar roll   POSTURE EDUCATION  Avoid forward head and UCS extension. Pull shoulders back.  Fist example to achieve proper head position/elevation.    Gait  CPT 44179 Y/N/G Total Time for Session Not performed                  Ther. Activity  CPT 34247 Y/N/G Total Time for Session Not performed   EDUCATION          Group  CPT 54057 Total Time for Session Not performed        ASSESSMENT:    This 84 y.o. year old male presents to PT with  above stated diagnosis. Physical Therapy evaluation reveals decreased ROM, edema, pain, abnormal muscle tone, decreased flexibility, decreased strength resulting in home management, self-care, community/leisure limitations. August MARLENE Maher Jr. will benefit from skilled PT services to address limitation, work towards rehab and patient goals and maximize PLOF of chosen ADLs.     Planned Services: The patient's treatment will include CPT 31667 Manual therapy, CPT 14482 Gait training, CPT 92305 Neuromuscular Reeducation, CPT 58468 Self-care/Home management training, CPT 21029 Therapeutic activities, CPT 19505 Therapeutic exercises, CPT 41551 Therapeutic Massage, CPT 09179 Hot/Cold Packs therapy, .     Norma Metzger

## 2024-04-02 NOTE — OP PT TREATMENT LOG
*G=group; Y=completed; N = not completed; H= held; HEP= has been provided for HEP*    # manual and/or verbal cues applied to ensure proper technique and sequence       DOS    IE 4/2/2024   POC DATES 4/2/24-6/1/24   EXTENDED POC    30 Day 5/2/24   60 Day    90 Day    Precautions    Insurance Auth    Treatment  Current Session Time   Modalities Total Time for Session Not performed   Heat/Ice  CPT 12079 Moist heat pre-activity R knee   E. Stimulation Manual  CPT 08405    E. Stim Unattended  CPT 19982    MECHANICAL TRACTION  CPT 25349    Ultrasound  CPT 32198    Manual   CPT 86422 Total Time for Session Not performed   STM/MFR/TPR STM to R knee for dec pain and inc soft tissue mobility   Instrument Assisted STM     Mobilizations    ROM/Flexibility    Myofascial Decompression    Ther. Exercise  CPT 17505                   Y/N/G Total Time for Session Not performed     Sets Reps Load Comment    PROGRESS NOTE/RE-EVAL/DISCHARGE NOTE ASSESSMENT        Quad sets        Hip ABD         Heel slides                                                Mechanical spine assessment     education     Biomechanical Assessment   PT provided continual visual assessment of mechanical faults throughout program, required manual and verbal cueing to improve joint biomechanics and normalize gross movement patterns.      BFR  BFR: EDUCATION ON INDICATIONS, LIMB CHANGES, POST TREATMENT EFFECTS, AND SIDE EFFECTS.  VERBAL PERMISSION OBTAINED TO PERFORM TREATMENT.    BFR PARAMETERS     Neuro Re-Ed  CPT 03168   Y/N/G Total Time for Session Not performed     Sets Reps Load Comment                           POSTURE EDUCATION  Lumbar biomechanics, derangement sx pattern, centralization vs peripheralization; frequency of FB must be reduced, HEP, use of lumbar roll   POSTURE EDUCATION  Avoid forward head and UCS extension. Pull shoulders back.  Fist example to achieve proper head position/elevation.    Gait  CPT 35359 Y/N/G Total Time for Session Not  performed                  Ther. Activity  CPT 30153 Y/N/G Total Time for Session Not performed   EDUCATION          Group  CPT 89710 Total Time for Session Not performed

## 2024-04-04 ENCOUNTER — HOSPITAL ENCOUNTER (OUTPATIENT)
Dept: PHYSICAL THERAPY | Age: 84
Setting detail: THERAPIES SERIES
Discharge: HOME | End: 2024-04-04
Attending: FAMILY MEDICINE
Payer: COMMERCIAL

## 2024-04-04 DIAGNOSIS — M25.561 ACUTE PAIN OF RIGHT KNEE: Primary | ICD-10-CM

## 2024-04-04 DIAGNOSIS — Z96.651 PRESENCE OF RIGHT ARTIFICIAL KNEE JOINT: ICD-10-CM

## 2024-04-04 PROCEDURE — 97010 HOT OR COLD PACKS THERAPY: CPT | Mod: GP

## 2024-04-04 PROCEDURE — 97110 THERAPEUTIC EXERCISES: CPT | Mod: GP

## 2024-04-04 NOTE — OP PT TREATMENT LOG
"*G=group; Y=completed; N = not completed; H= held; HEP= has been provided for HEP*    # manual and/or verbal cues applied to ensure proper technique and sequence       DOS    IE 4/2/2024   POC DATES 4/2/24-6/1/24   EXTENDED POC    30 Day 5/2/24   60 Day    90 Day    Precautions    Insurance Auth    Treatment  Current Session Time   Modalities Total Time for Session 8-22 Minutes   Heat/Ice  CPT 53415 Moist heat pre-activity R knee   E. Stimulation Manual  CPT 81205    E. Stim Unattended  CPT 30329    MECHANICAL TRACTION  CPT 63667    Ultrasound  CPT 21161    Manual   CPT 54490 Total Time for Session Not performed   STM/MFR/TPR STM to R knee for dec pain and inc soft tissue mobility   Instrument Assisted STM     Mobilizations    ROM/Flexibility    Myofascial Decompression    Ther. Exercise  CPT 59769                   Y/N/G Total Time for Session 38-52 Minutes     Sets Reps Load Comment    PROGRESS NOTE/RE-EVAL/DISCHARGE NOTE ASSESSMENT        Quad sets        Hip ABD         Hip ADD with ball Y 1 20 3\"    Heel slides Y 10 10 10\"    bridge        SAQ  Y 1 20 3# 3 second hold   SLR Y 1 20     HS stretch Y 1 3 20\" Supine with strap   Gastroc stretch y 1 4 20\" Wedge at steps           HR                        NuStep Y   10'    Mechanical spine assessment     education     Biomechanical Assessment   PT provided continual visual assessment of mechanical faults throughout program, required manual and verbal cueing to improve joint biomechanics and normalize gross movement patterns.      BFR  BFR: EDUCATION ON INDICATIONS, LIMB CHANGES, POST TREATMENT EFFECTS, AND SIDE EFFECTS.  VERBAL PERMISSION OBTAINED TO PERFORM TREATMENT.    BFR PARAMETERS     Neuro Re-Ed  CPT 83072   Y/N/G Total Time for Session Not performed     Sets Reps Load Comment                           POSTURE EDUCATION  Lumbar biomechanics, derangement sx pattern, centralization vs peripheralization; frequency of FB must be reduced, HEP, use of lumbar roll "   POSTURE EDUCATION  Avoid forward head and UCS extension. Pull shoulders back.  Fist example to achieve proper head position/elevation.    Gait  CPT 99448 Y/N/G Total Time for Session Not performed                  Ther. Activity  CPT 53224 Y/N/G Total Time for Session Not performed   EDUCATION          Group  CPT 13812 Total Time for Session Not performed

## 2024-04-08 ENCOUNTER — HOSPITAL ENCOUNTER (OUTPATIENT)
Dept: PHYSICAL THERAPY | Age: 84
Setting detail: THERAPIES SERIES
Discharge: HOME | End: 2024-04-08
Attending: FAMILY MEDICINE
Payer: COMMERCIAL

## 2024-04-08 DIAGNOSIS — M25.561 ACUTE PAIN OF RIGHT KNEE: Primary | ICD-10-CM

## 2024-04-08 DIAGNOSIS — Z96.651 PRESENCE OF RIGHT ARTIFICIAL KNEE JOINT: ICD-10-CM

## 2024-04-08 PROCEDURE — 97110 THERAPEUTIC EXERCISES: CPT | Mod: GP

## 2024-04-08 PROCEDURE — 97140 MANUAL THERAPY 1/> REGIONS: CPT | Mod: GP

## 2024-04-08 PROCEDURE — 97530 THERAPEUTIC ACTIVITIES: CPT | Mod: GP

## 2024-04-08 NOTE — OP PT TREATMENT LOG
"*G=group; Y=completed; N = not completed; H= held; HEP= has been provided for HEP*    # manual and/or verbal cues applied to ensure proper technique and sequence       DOS    IE 4/2/2024   POC DATES 4/2/24-6/1/24   EXTENDED POC    30 Day 5/2/24   60 Day    90 Day    Precautions    Insurance Auth    Treatment  Current Session Time   Modalities (10) Total Time for Session 8-22 Minutes   Heat/Ice  CPT 34382 Moist heat R knee during supine there.ex   E. Stimulation Manual  CPT 22304    E. Stim Unattended  CPT 72274    MECHANICAL TRACTION  CPT 40729    Ultrasound  CPT 10614    Manual (10)  CPT 06247 Total Time for Session 8-22 Minutes   STM/MFR/TPR STM to R knee for dec pain and inc soft tissue mobility   Instrument Assisted STM     Mobilizations    ROM/Flexibility    Myofascial Decompression    Ther. Exercise (33)  CPT 17883                   Y/N/G Total Time for Session 23-37 Minutes     Sets Reps Load Comment    PROGRESS NOTE/RE-EVAL/DISCHARGE NOTE ASSESSMENT        Quad sets        Hip ABD         Hip ADD with ball Y 1 20 3\" W/core engagement   Heel slides Y 10 10 10\"    bridge        SAQ  h 1 20 3# 3 second hold   SLR Y 1 20     HS stretch Y 1 3 20\" Supine with strap   Gastroc stretch y 1 4 20\" Wedge at steps   Quad iso  y 1 10  5 sec hold   HR                        NuStep Y   10'    Mechanical spine assessment     education     Biomechanical Assessment   PT provided continual visual assessment of mechanical faults throughout program, required manual and verbal cueing to improve joint biomechanics and normalize gross movement patterns.      BFR  BFR: EDUCATION ON INDICATIONS, LIMB CHANGES, POST TREATMENT EFFECTS, AND SIDE EFFECTS.  VERBAL PERMISSION OBTAINED TO PERFORM TREATMENT.    BFR PARAMETERS     Neuro Re-Ed  CPT 41092   Y/N/G Total Time for Session 0-7 Minutes     Sets Reps Load Comment                           POSTURE EDUCATION  Lumbar biomechanics, derangement sx pattern, centralization vs " peripheralization; frequency of FB must be reduced, HEP, use of lumbar roll   POSTURE EDUCATION Y    y Avoid forward head and UCS extension. Pull shoulders back.  Fist example to achieve proper head position/elevation.   Cues for upright posture, chin tucks and hold it for 30 sec    Gait  CPT 65690 Y/N/G Total Time for Session Not performed                  Ther. Activity(10min)  CPT 23372 Y/N/G Total Time for Session 8-22 Minutes   EDUCATION y Stair training w/cues for weight shifting, sequence,safe hand placement.        Group  CPT 38140 Total Time for Session Not performed

## 2024-04-08 NOTE — PROGRESS NOTES
Physical Therapy Visit    PT DAILY NOTE FOR OUTPATIENT THERAPY    Patient: Tre Maher Jr. MRN: [id number]  : 1940 84 y.o.  Referring Physician: Bob Ibarra DO  Date of Visit: 2024    Certification Dates: 24 through 24    Diagnosis:   1. Acute pain of right knee    2. Presence of right artificial knee joint        Chief Complaints:  R knee pain/soreness    Precautions:   Existing Precautions/Restrictions: no known precautions/restrictions      TODAY'S VISIT    Time In Session:  Start Time: 1146  Stop Time: 1245  Time Calculation (min): 59 min   History/Vitals/Pain/Encounter Info - 24 1147          Injury History/Precautions/Daily Required Info    Document Type daily treatment     Chief Complaint/Reason for Visit  R knee pain/soreness     Referring Physician DAWSON     Existing Precautions/Restrictions no known precautions/restrictions     History of present illness/functional impairment Several months ago, injured right knee doing leg press machine, then later, stood on ladder  x 1 hour and seemed to aggravate knee.  Knee had previous been a problem for past several years.   Worse:  first steps after sitting, step to up/down, can do step thru up stairs;  uses furniture around house, uses cane outside home (more recently); wakes from sound sleep with rolling; intermittent pain; trying to kick off shoes in posterior right knee., + bakers cyst     Patient/Family/Caregiver Comments/Observations Pt reports ot have soreness in R knee.     Patient reported fall since last visit No        Pain Assessment    Currently in pain Yes     Pain Side/Orientation right     Pain Rating (0-10): Pre Activity 4     Pain Rating (0-10): Activity 4     Pain Description dull        Pain Intervention    Intervention  MT, heat     Post Intervention Comments See assessment                    Daily Treatment Assessment and Plan - 24 1147          Daily Treatment Assessment and Plan    Daily Outcome  "Summary Pt reported of pain in his lowerback with heel slides on R. He was able to tolerate seated ther.ex better than supine. Decent amount of joint restrictions noted w/knee flexion. pt not able to tolerate jt mobs at this time. Taught stair training, pt was able to follow directions and implement in the session. Pt needs further training in all functional transfers to decerase fall risk. Educated and encouraged the patient to  start using the cane for walking.                         OBJECTIVE DATA TAKEN TODAY:    Today's Treatment:    *G=group; Y=completed; N = not completed; H= held; HEP= has been provided for HEP*    # manual and/or verbal cues applied to ensure proper technique and sequence       DOS    IE 4/2/2024   POC DATES 4/2/24-6/1/24   EXTENDED POC    30 Day 5/2/24   60 Day    90 Day    Precautions    Insurance Auth    Treatment  Current Session Time   Modalities (10) Total Time for Session 8-22 Minutes   Heat/Ice  CPT 32119 Moist heat R knee during supine there.ex   E. Stimulation Manual  CPT 64450    E. Stim Unattended  CPT 06101    MECHANICAL TRACTION  CPT 85879    Ultrasound  CPT 14387    Manual (10)  CPT 74683 Total Time for Session 8-22 Minutes   STM/MFR/TPR STM to R knee for dec pain and inc soft tissue mobility   Instrument Assisted STM     Mobilizations    ROM/Flexibility    Myofascial Decompression    Ther. Exercise (33)  CPT 86840                   Y/N/G Total Time for Session 23-37 Minutes     Sets Reps Load Comment    PROGRESS NOTE/RE-EVAL/DISCHARGE NOTE ASSESSMENT        Quad sets        Hip ABD         Hip ADD with ball Y 1 20 3\" W/core engagement   Heel slides Y 10 10 10\"    bridge        SAQ  h 1 20 3# 3 second hold   SLR Y 1 20     HS stretch Y 1 3 20\" Supine with strap   Gastroc stretch y 1 4 20\" Wedge at steps   Quad iso  y 1 10  5 sec hold   HR                        NuStep Y   10'    Mechanical spine assessment     education     Biomechanical Assessment   PT provided continual " visual assessment of mechanical faults throughout program, required manual and verbal cueing to improve joint biomechanics and normalize gross movement patterns.      BFR  BFR: EDUCATION ON INDICATIONS, LIMB CHANGES, POST TREATMENT EFFECTS, AND SIDE EFFECTS.  VERBAL PERMISSION OBTAINED TO PERFORM TREATMENT.    BFR PARAMETERS     Neuro Re-Ed  CPT 16397   Y/N/G Total Time for Session 0-7 Minutes     Sets Reps Load Comment                           POSTURE EDUCATION  Lumbar biomechanics, derangement sx pattern, centralization vs peripheralization; frequency of FB must be reduced, HEP, use of lumbar roll   POSTURE EDUCATION Y    y Avoid forward head and UCS extension. Pull shoulders back.  Fist example to achieve proper head position/elevation.   Cues for upright posture, chin tucks and hold it for 30 sec    Gait  CPT 76932 Y/N/G Total Time for Session Not performed                  Ther. Activity(10min)  CPT 17601 Y/N/G Total Time for Session 8-22 Minutes   EDUCATION y Stair training w/cues for weight shifting, sequence,safe hand placement.        Group  CPT 98178 Total Time for Session Not performed

## 2024-04-11 ENCOUNTER — HOSPITAL ENCOUNTER (OUTPATIENT)
Dept: PHYSICAL THERAPY | Age: 84
Setting detail: THERAPIES SERIES
Discharge: HOME | End: 2024-04-11
Attending: FAMILY MEDICINE
Payer: COMMERCIAL

## 2024-04-11 DIAGNOSIS — M25.561 ACUTE PAIN OF RIGHT KNEE: Primary | ICD-10-CM

## 2024-04-11 PROCEDURE — 97116 GAIT TRAINING THERAPY: CPT | Mod: GP

## 2024-04-11 PROCEDURE — 97110 THERAPEUTIC EXERCISES: CPT | Mod: GP

## 2024-04-11 PROCEDURE — 97010 HOT OR COLD PACKS THERAPY: CPT | Mod: GP

## 2024-04-11 PROCEDURE — 97140 MANUAL THERAPY 1/> REGIONS: CPT | Mod: GP

## 2024-04-11 NOTE — OP PT TREATMENT LOG
"*G=group; Y=completed; N = not completed; H= held; HEP= has been provided for HEP*    # manual and/or verbal cues applied to ensure proper technique and sequence       DOS    IE 4/2/2024   POC DATES 4/2/24-6/1/24   EXTENDED POC    30 Day 5/2/24   60 Day    90 Day    Precautions    Insurance Auth    Treatment  Current Session Time   Modalities (10) Total Time for Session 8-22 Minutes   Heat/Ice  CPT 94087 Moist heat R knee during supine there.ex   E. Stimulation Manual  CPT 03327    E. Stim Unattended  CPT 28305    MECHANICAL TRACTION  CPT 77524    Ultrasound  CPT 91300    Manual (10)  CPT 59888 Total Time for Session 8-22 Minutes   STM/MFR/TPR STM to R knee for dec pain and inc soft tissue mobility   Instrument Assisted STM     Mobilizations Knee gentle PA for pain relief and ROM   ROM/Flexibility    Myofascial Decompression    Ther. Exercise (35)  CPT 98218                   Y/N/G Total Time for Session 23-37 Minutes     Sets Reps Load Comment    PROGRESS NOTE/RE-EVAL/DISCHARGE NOTE ASSESSMENT        Quad sets        Hip ABD  y 1 20 5 sec hold  W/cues for technique    Hip ADD with ball Y 1 20 3\" W/core engagement   Heel slides Y 10 10 10\"    bridge        SAQ  y 1 20 3# 3 second hold   SLR Y 1 10  10 sec hold   HS stretch Y 1 3 20\" Supine with strap   Gastroc stretch h 1 4 20\" Wedge at steps   Quad iso  y 1 10  5 sec hold   HR        Prone quad y 1 10   stretch   Standing lean y 1 10 4'' step W/cues to lean into R knee    NuStep n   10'    Mechanical spine assessment     education     Biomechanical Assessment   PT provided continual visual assessment of mechanical faults throughout program, required manual and verbal cueing to improve joint biomechanics and normalize gross movement patterns.      BFR  BFR: EDUCATION ON INDICATIONS, LIMB CHANGES, POST TREATMENT EFFECTS, AND SIDE EFFECTS.  VERBAL PERMISSION OBTAINED TO PERFORM TREATMENT.    BFR PARAMETERS     Neuro Re-Ed  CPT 78275   Y/N/G Total Time for Session 0-7 " Minutes     Sets Reps Load Comment                           POSTURE EDUCATION  Lumbar biomechanics, derangement sx pattern, centralization vs peripheralization; frequency of FB must be reduced, HEP, use of lumbar roll   POSTURE EDUCATION Y     Avoid forward head and UCS extension. Pull shoulders back.  Fist example to achieve proper head position/elevation.   Cues for upright posture, chin tucks and hold it for 30 sec    Gait (8 min)  CPT 56377 Y/N/G Total Time for Session 8-22 Minutes    y W/cues for heel strike, weight shifting and arm swing             Ther. Activity(min)  CPT 70916 Y/N/G Total Time for Session 0-7 Minutes   EDUCATION y Stair training w/cues for weight shifting, sequence,safe hand placement.        Group  CPT 65331 Total Time for Session Not performed

## 2024-04-13 NOTE — PROGRESS NOTES
Physical Therapy Visit    PT DAILY NOTE FOR OUTPATIENT THERAPY    Patient: Tre Maher Jr. MRN: [id number]  : 1940 84 y.o.  Referring Physician: Bob Ibarra DO  Date of Visit: 2024    Certification Dates: 24 through 24    Diagnosis:   1. Acute pain of right knee        Chief Complaints:  R knee pain/soreness    Precautions:   Existing Precautions/Restrictions: no known precautions/restrictions      TODAY'S VISIT    Time In Session:  Start Time: 0250  Stop Time: 0350  Time Calculation (min): 60 min   History/Vitals/Pain/Encounter Info - 24 1451          Injury History/Precautions/Daily Required Info    Document Type daily treatment     Chief Complaint/Reason for Visit  R knee pain/soreness     Referring Physician DAWSON     Existing Precautions/Restrictions no known precautions/restrictions     History of present illness/functional impairment Several months ago, injured right knee doing leg press machine, then later, stood on ladder  x 1 hour and seemed to aggravate knee.  Knee had previous been a problem for past several years.   Worse:  first steps after sitting, step to up/down, can do step thru up stairs;  uses furniture around house, uses cane outside home (more recently); wakes from sound sleep with rolling; intermittent pain; trying to kick off shoes in posterior right knee., + bakers cyst     Patient reported fall since last visit No        Pain Assessment    Currently in pain Yes     Preferred Pain Scale number (Numeric Rating Pain Scale)     Pain Side/Orientation right     Pain: Body location Knee     Pain Rating (0-10): Pre Activity 5     Pain Rating (0-10): Activity 6     Pain Description dull        Pain Intervention    Intervention  Heat, education and stretching     Post Intervention Comments see assessment                    Daily Treatment Assessment and Plan - 24 1451          Daily Treatment Assessment and Plan    Progress toward goals Progressing      "Daily Outcome Summary Progressed pt to standing ther.ex with weight bearing as tolerated and B UE support. Post R Knee jt mobs pt was able to tolerate knee flexion and 1 degree improvement noticed.     Plan and Recommendations Cont with PT POC focusing on R knee ROM and strengthening. Consider mobs for increasing knee flexion. Add HEP including stretching and strengthening pending how patient's tolerance was toward treatment                         OBJECTIVE DATA TAKEN TODAY:    Today's Treatment:    *G=group; Y=completed; N = not completed; H= held; HEP= has been provided for HEP*    # manual and/or verbal cues applied to ensure proper technique and sequence       DOS    IE 4/2/2024   POC DATES 4/2/24-6/1/24   EXTENDED POC    30 Day 5/2/24   60 Day    90 Day    Precautions    Insurance Auth    Treatment  Current Session Time   Modalities (10) Total Time for Session 8-22 Minutes   Heat/Ice  CPT 30672 Moist heat R knee during supine there.ex   E. Stimulation Manual  CPT 54165    E. Stim Unattended  CPT 35164    MECHANICAL TRACTION  CPT 93712    Ultrasound  CPT 90486    Manual (10)  CPT 36612 Total Time for Session 8-22 Minutes   STM/MFR/TPR STM to R knee for dec pain and inc soft tissue mobility   Instrument Assisted STM     Mobilizations Knee gentle PA for pain relief and ROM   ROM/Flexibility    Myofascial Decompression    Ther. Exercise (35)  CPT 08667                   Y/N/G Total Time for Session 23-37 Minutes     Sets Reps Load Comment    PROGRESS NOTE/RE-EVAL/DISCHARGE NOTE ASSESSMENT        Quad sets        Hip ABD  y 1 20 5 sec hold  W/cues for technique    Hip ADD with ball Y 1 20 3\" W/core engagement   Heel slides Y 10 10 10\"    bridge        SAQ  y 1 20 3# 3 second hold   SLR Y 1 10  10 sec hold   HS stretch Y 1 3 20\" Supine with strap   Gastroc stretch h 1 4 20\" Wedge at steps   Quad iso  y 1 10  5 sec hold   HR        Prone quad y 1 10   stretch   Standing lean y 1 10 4'' step W/cues to lean into R knee "    NuStep n   10'    Mechanical spine assessment     education     Biomechanical Assessment   PT provided continual visual assessment of mechanical faults throughout program, required manual and verbal cueing to improve joint biomechanics and normalize gross movement patterns.      BFR  BFR: EDUCATION ON INDICATIONS, LIMB CHANGES, POST TREATMENT EFFECTS, AND SIDE EFFECTS.  VERBAL PERMISSION OBTAINED TO PERFORM TREATMENT.    BFR PARAMETERS     Neuro Re-Ed  CPT 93614   Y/N/G Total Time for Session 0-7 Minutes     Sets Reps Load Comment                           POSTURE EDUCATION  Lumbar biomechanics, derangement sx pattern, centralization vs peripheralization; frequency of FB must be reduced, HEP, use of lumbar roll   POSTURE EDUCATION Y     Avoid forward head and UCS extension. Pull shoulders back.  Fist example to achieve proper head position/elevation.   Cues for upright posture, chin tucks and hold it for 30 sec    Gait (8 min)  CPT 81497 Y/N/G Total Time for Session 8-22 Minutes    y W/cues for heel strike, weight shifting and arm swing             Ther. Activity(min)  CPT 42898 Y/N/G Total Time for Session 0-7 Minutes   EDUCATION y Stair training w/cues for weight shifting, sequence,safe hand placement.        Group  CPT 10132 Total Time for Session Not performed

## 2024-04-15 ENCOUNTER — HOSPITAL ENCOUNTER (OUTPATIENT)
Dept: PHYSICAL THERAPY | Age: 84
Setting detail: THERAPIES SERIES
Discharge: HOME | End: 2024-04-15
Attending: FAMILY MEDICINE
Payer: COMMERCIAL

## 2024-04-15 DIAGNOSIS — M25.561 ACUTE PAIN OF RIGHT KNEE: Primary | ICD-10-CM

## 2024-04-15 DIAGNOSIS — Z96.651 PRESENCE OF RIGHT ARTIFICIAL KNEE JOINT: ICD-10-CM

## 2024-04-15 PROCEDURE — 97110 THERAPEUTIC EXERCISES: CPT | Mod: GP

## 2024-04-15 PROCEDURE — 97010 HOT OR COLD PACKS THERAPY: CPT | Mod: GP

## 2024-04-15 PROCEDURE — 97116 GAIT TRAINING THERAPY: CPT | Mod: GP

## 2024-04-15 PROCEDURE — 97140 MANUAL THERAPY 1/> REGIONS: CPT | Mod: GP

## 2024-04-15 NOTE — OP PT TREATMENT LOG
"*G=group; Y=completed; N = not completed; H= held; HEP= has been provided for HEP*    # manual and/or verbal cues applied to ensure proper technique and sequence       DOS    IE 4/2/2024   POC DATES 4/2/24-6/1/24   EXTENDED POC    30 Day 5/2/24   60 Day    90 Day    Precautions    Insurance Auth    Treatment  Current Session Time   Modalities (10) Total Time for Session 8-22 Minutes   Heat/Ice  CPT 32960 Moist heat R knee during supine there.ex   E. Stimulation Manual  CPT 19866    E. Stim Unattended  CPT 42986    MECHANICAL TRACTION  CPT 41454    Ultrasound  CPT 81829    Manual (15)  CPT 15132 Total Time for Session 8-22 Minutes   STM/MFR/TPR STM to R knee to dec pain and gentle soft tissue mobility   Instrument Assisted STM     Mobilizations Knee gentle PA for pain relief and ROM(nt)   ROM/Flexibility    Myofascial Decompression    Ther. Exercise (32)  CPT 31306                   Y/N/G Total Time for Session 23-37 Minutes     Sets Reps Load Comment    PROGRESS NOTE/RE-EVAL/DISCHARGE NOTE ASSESSMENT        Quad sets n       Hip flx y 1 10 w/2# ankle wt     Hip ABD  y 1 10 w/2#  W/cues for technique    Hip ADD with ball y 1 20 3\" W/core engagement   Hip ext  y 1 10 5 sec hold W/glute squeeze at end range   Heel slides n 10 10 10\"    bridge        SAQ  y 1 20 3# 3 second hold   SLR h 1 10  10 sec hold   HS stretch Y 1 3 20\" Supine with strap   Gastroc stretch y 1 10 20\" Wedge at steps, 5 sec hold   Quad iso  h 1 10  5 sec hold   HR        Prone quad h 1 10   stretch   Standing lean y 1 10 4'' step W/cues to lean into R knee    NuStep n   10'    Mechanical spine assessment     education     Biomechanical Assessment   PT provided continual visual assessment of mechanical faults throughout program, required manual and verbal cueing to improve joint biomechanics and normalize gross movement patterns.      BFR  BFR: EDUCATION ON INDICATIONS, LIMB CHANGES, POST TREATMENT EFFECTS, AND SIDE EFFECTS.  VERBAL PERMISSION " OBTAINED TO PERFORM TREATMENT.    BFR PARAMETERS     Neuro Re-Ed  CPT 34993   Y/N/G Total Time for Session 0-7 Minutes     Sets Reps Load Comment                           POSTURE EDUCATION  Lumbar biomechanics, derangement sx pattern, centralization vs peripheralization; frequency of FB must be reduced, HEP, use of lumbar roll   POSTURE EDUCATION Y     Avoid forward head and UCS extension. Pull shoulders back.  Fist example to achieve proper head position/elevation.   Cues for upright posture, chin tucks and hold it for 30 sec    Gait (8 min)  CPT 46642 Y/N/G Total Time for Session 8-22 Minutes    y W/cues for heel strike, weight shifting and arm swing              Ther. Activity(min)  CPT 68421 Y/N/G Total Time for Session 0-7 Minutes   EDUCATION y Stair training w/cues for weight shifting, sequence,safe hand placement.        Group  CPT 99868 Total Time for Session Not performed

## 2024-04-15 NOTE — PROGRESS NOTES
Physical Therapy Visit    PT DAILY NOTE FOR OUTPATIENT THERAPY    Patient: Tre Maher Jr. MRN: [id number]  : 1940 84 y.o.  Referring Physician: Bob Ibarra DO  Date of Visit: 4/15/2024    Certification Dates: 24 through 24    Diagnosis:   1. Acute pain of right knee    2. Presence of right artificial knee joint        Chief Complaints:  R knee pain/soreness    Precautions:   Existing Precautions/Restrictions: no known precautions/restrictions      TODAY'S VISIT    Time In Session:  Start Time: 1150  Stop Time: 1250  Time Calculation (min): 60 min   History/Vitals/Pain/Encounter Info - 04/15/24 1150          Injury History/Precautions/Daily Required Info    Document Type daily treatment     Chief Complaint/Reason for Visit  R knee pain/soreness     Referring Physician DAWSON     Existing Precautions/Restrictions no known precautions/restrictions     History of present illness/functional impairment Several months ago, injured right knee doing leg press machine, then later, stood on ladder  x 1 hour and seemed to aggravate knee.  Knee had previous been a problem for past several years.   Worse:  first steps after sitting, step to up/down, can do step thru up stairs;  uses furniture around house, uses cane outside home (more recently); wakes from sound sleep with rolling; intermittent pain; trying to kick off shoes in posterior right knee., + bakers cyst     Patient/Family/Caregiver Comments/Observations Pt reports of R knee pain 2-3/10, stated to have bruise on medial knee since few weeks which comes and goes. Using cane today, thinks his R knee is going to give away sometimes. His grandson was in ER today and pt did not have enough sleep.     Patient reported fall since last visit No        Pain Assessment    Currently in pain Yes     Pain Side/Orientation right     Pain: Body location Knee     Pain Rating (0-10): Pre Activity 3     Pain Rating (0-10): Activity 7   Initial step after  sitting and then eases    Pain Description deep;sharp;aching   Sharp pain going down steps       Pain Intervention    Intervention  Heat, MT, education     Post Intervention Comments See assessment                    Daily Treatment Assessment and Plan - 04/15/24 0287          Daily Treatment Assessment and Plan    Daily Outcome Summary Pt had pain on R knee, did not find much relief with any soft tissue techniques this session. He was able to decrease R knee pain with initial step w/Quad concentric contraction. He required cues for heel to toe gait pattern combined with quad voluntary contraction helped w/knee pain. Pt requested to have consistent PT tx from single provider. Explained to him that he may have to see PT and PTA depending on PT's availability and he is agreeable to it.                         OBJECTIVE DATA TAKEN TODAY:    Today's Treatment:    *G=group; Y=completed; N = not completed; H= held; HEP= has been provided for HEP*    # manual and/or verbal cues applied to ensure proper technique and sequence       DOS    IE 4/2/2024   POC DATES 4/2/24-6/1/24   EXTENDED POC    30 Day 5/2/24   60 Day    90 Day    Precautions    Insurance Auth    Treatment  Current Session Time   Modalities (10) Total Time for Session 8-22 Minutes   Heat/Ice  CPT 71703 Moist heat R knee during supine there.ex   E. Stimulation Manual  CPT 77481    E. Stim Unattended  CPT 67475    MECHANICAL TRACTION  CPT 11929    Ultrasound  CPT 62354    Manual (15)  CPT 54169 Total Time for Session 8-22 Minutes   STM/MFR/TPR STM to R knee to dec pain and gentle soft tissue mobility   Instrument Assisted STM     Mobilizations Knee gentle PA for pain relief and ROM(nt)   ROM/Flexibility    Myofascial Decompression    Ther. Exercise (32)  CPT 88974                   Y/N/G Total Time for Session 23-37 Minutes     Sets Reps Load Comment    PROGRESS NOTE/RE-EVAL/DISCHARGE NOTE ASSESSMENT        Quad sets n       Hip flx y 1 10 w/2# ankle wt     Hip  "ABD  y 1 10 w/2#  W/cues for technique    Hip ADD with ball y 1 20 3\" W/core engagement   Hip ext  y 1 10 5 sec hold W/glute squeeze at end range   Heel slides n 10 10 10\"    bridge        SAQ  y 1 20 3# 3 second hold   SLR h 1 10  10 sec hold   HS stretch Y 1 3 20\" Supine with strap   Gastroc stretch y 1 10 20\" Wedge at steps, 5 sec hold   Quad iso  h 1 10  5 sec hold   HR        Prone quad h 1 10   stretch   Standing lean y 1 10 4'' step W/cues to lean into R knee    NuStep n   10'    Mechanical spine assessment     education     Biomechanical Assessment   PT provided continual visual assessment of mechanical faults throughout program, required manual and verbal cueing to improve joint biomechanics and normalize gross movement patterns.      BFR  BFR: EDUCATION ON INDICATIONS, LIMB CHANGES, POST TREATMENT EFFECTS, AND SIDE EFFECTS.  VERBAL PERMISSION OBTAINED TO PERFORM TREATMENT.    BFR PARAMETERS     Neuro Re-Ed  CPT 80119   Y/N/G Total Time for Session 0-7 Minutes     Sets Reps Load Comment                           POSTURE EDUCATION  Lumbar biomechanics, derangement sx pattern, centralization vs peripheralization; frequency of FB must be reduced, HEP, use of lumbar roll   POSTURE EDUCATION Y     Avoid forward head and UCS extension. Pull shoulders back.  Fist example to achieve proper head position/elevation.   Cues for upright posture, chin tucks and hold it for 30 sec    Gait (8 min)  CPT 85249 Y/N/G Total Time for Session 8-22 Minutes    y W/cues for heel strike, weight shifting and arm swing              Ther. Activity(min)  CPT 29617 Y/N/G Total Time for Session 0-7 Minutes   EDUCATION y Stair training w/cues for weight shifting, sequence,safe hand placement.        Group  CPT 79273 Total Time for Session Not performed                             "

## 2024-04-18 ENCOUNTER — HOSPITAL ENCOUNTER (OUTPATIENT)
Dept: PHYSICAL THERAPY | Age: 84
Setting detail: THERAPIES SERIES
Discharge: HOME | End: 2024-04-18
Attending: FAMILY MEDICINE
Payer: COMMERCIAL

## 2024-04-18 DIAGNOSIS — M25.561 ACUTE PAIN OF RIGHT KNEE: Primary | ICD-10-CM

## 2024-04-18 DIAGNOSIS — Z96.651 PRESENCE OF RIGHT ARTIFICIAL KNEE JOINT: ICD-10-CM

## 2024-04-18 PROCEDURE — 97112 NEUROMUSCULAR REEDUCATION: CPT | Mod: GP

## 2024-04-18 PROCEDURE — 97010 HOT OR COLD PACKS THERAPY: CPT | Mod: GP

## 2024-04-18 PROCEDURE — 97110 THERAPEUTIC EXERCISES: CPT | Mod: GP

## 2024-04-18 PROCEDURE — 97140 MANUAL THERAPY 1/> REGIONS: CPT | Mod: GP

## 2024-04-18 NOTE — PROGRESS NOTES
Physical Therapy Visit    PT DAILY NOTE FOR OUTPATIENT THERAPY    Patient: Tre Maher Jr. MRN: [id number]  : 1940 84 y.o.  Referring Physician: Bob Ibarra DO  Date of Visit: 2024    Certification Dates: 24 through 24    Diagnosis:   1. Acute pain of right knee    2. Presence of right artificial knee joint        Chief Complaints:  R knee pain/soreness    Precautions:   Existing Precautions/Restrictions: no known precautions/restrictions      TODAY'S VISIT    Time In Session:  Start Time: 0145  Stop Time: 0245  Time Calculation (min): 60 min   History/Vitals/Pain/Encounter Info - 24 1353          Injury History/Precautions/Daily Required Info    Document Type daily treatment     Chief Complaint/Reason for Visit  R knee pain/soreness     Referring Physician DAWSON     Existing Precautions/Restrictions no known precautions/restrictions     History of present illness/functional impairment Several months ago, injured right knee doing leg press machine, then later, stood on ladder  x 1 hour and seemed to aggravate knee.  Knee had previous been a problem for past several years.   Worse:  first steps after sitting, step to up/down, can do step thru up stairs;  uses furniture around house, uses cane outside home (more recently); wakes from sound sleep with rolling; intermittent pain; trying to kick off shoes in posterior right knee., + bakers cyst     Patient reported fall since last visit No        Pain Assessment    Currently in pain Yes     Preferred Pain Scale number (Numeric Rating Pain Scale)     Pain Side/Orientation right     Pain: Body location Knee     Pain Rating (0-10): Pre Activity 4     Pain Description sharp   Sharp pain with navigating steps       Pain Intervention    Intervention  Heat, MT, education     Post Intervention Comments see assessment                    Daily Treatment Assessment and Plan - 24 8154          Daily Treatment Assessment and Plan     "Daily Outcome Summary Pt continues to have pain with ascending stairs, pt has pain in R medial knee during toe off phase and with initial weight bearing after prolonged sitting. With distal joint distraction pt was able to increase knee flexion however is unable actively due to pain and joint restrictions. Educated the patient to make an appointment with his surgeon for further workup secondary to pain and to focus on strength training.                         OBJECTIVE DATA TAKEN TODAY:    Today's Treatment:    *G=group; Y=completed; N = not completed; H= held; HEP= has been provided for HEP*    # manual and/or verbal cues applied to ensure proper technique and sequence       DOS    IE 4/2/2024   POC DATES 4/2/24-6/1/24   EXTENDED POC    30 Day 5/2/24   60 Day    90 Day    Precautions    Insurance Auth    Treatment  Current Session Time   Modalities (10) Total Time for Session 8-22 Minutes   Heat/Ice  CPT 48146 Moist heat R knee during supine there.ex   E. Stimulation Manual  CPT 39848    E. Stim Unattended  CPT 41249    MECHANICAL TRACTION  CPT 87448    Ultrasound  CPT 36950    Manual (8)  CPT 43840 Total Time for Session 8-22 Minutes   STM/MFR/TPR STM to R knee to dec pain and gentle soft tissue mobility   Instrument Assisted STM     Mobilizations Knee gentle PA for pain relief and ROM(nt)   ROM/Flexibility    Myofascial Decompression    Ther. Exercise (35)  CPT 69861                   Y/N/G Total Time for Session 23-37 Minutes     Sets Reps Load Comment    PROGRESS NOTE/RE-EVAL/DISCHARGE NOTE ASSESSMENT        Quad sets n 1 10  10 sec hold   Standing marching  y 1 10 w/2# ankle wt     Hip ABD  y 1 10 w/2#  W/cues for technique    Hip ADD with ball y 1 20 3\" W/core engagement   Hip ext  y 1 10 5 sec hold W/glute squeeze at end range   Heel slides n 10 10 10\"    bridge        SAQ  n 1 20 3# 3 second hold   SLR h 1 10  10 sec hold   HS stretch n 1 3 20\" Supine with strap   Gastroc stretch n 1 10 20\" Wedge at steps, " 5 sec hold   Quad iso  y 1 10  5 sec hold   HR        Prone quad h 1 10   stretch   Standing lean n 1 10 4'' step W/cues to lean into R knee    NuStep y   10' 5 min    Mechanical spine assessment     education     Biomechanical Assessment   PT provided continual visual assessment of mechanical faults throughout program, required manual and verbal cueing to improve joint biomechanics and normalize gross movement patterns.      BFR  BFR: EDUCATION ON INDICATIONS, LIMB CHANGES, POST TREATMENT EFFECTS, AND SIDE EFFECTS.  VERBAL PERMISSION OBTAINED TO PERFORM TREATMENT.    BFR PARAMETERS     Neuro Re-Ed  CPT 09760   Y/N/G Total Time for Session 0-7 Minutes     Sets Reps Load Comment                           POSTURE EDUCATION  Lumbar biomechanics, derangement sx pattern, centralization vs peripheralization; frequency of FB must be reduced, HEP, use of lumbar roll   POSTURE EDUCATION Y     Avoid forward head and UCS extension. Pull shoulders back.  Fist example to achieve proper head position/elevation.   Cues for upright posture, chin tucks and hold it for 30 sec    Gait (10 min)  CPT 07458 Y/N/G Total Time for Session 8-22 Minutes    y W/cues for heel strike, weight shifting and arm swing              Ther. Activity(min)  CPT 28665 Y/N/G Total Time for Session 0-7 Minutes   EDUCATION h Stair training w/cues for weight shifting, sequence,safe hand placement.        Group  CPT 47991 Total Time for Session Not performed

## 2024-04-18 NOTE — OP PT TREATMENT LOG
"*G=group; Y=completed; N = not completed; H= held; HEP= has been provided for HEP*    # manual and/or verbal cues applied to ensure proper technique and sequence       DOS    IE 4/2/2024   POC DATES 4/2/24-6/1/24   EXTENDED POC    30 Day 5/2/24   60 Day    90 Day    Precautions    Insurance Auth    Treatment  Current Session Time   Modalities (10) Total Time for Session 8-22 Minutes   Heat/Ice  CPT 08820 Moist heat R knee during supine there.ex   E. Stimulation Manual  CPT 42657    E. Stim Unattended  CPT 32849    MECHANICAL TRACTION  CPT 86988    Ultrasound  CPT 20767    Manual (8)  CPT 90784 Total Time for Session 8-22 Minutes   STM/MFR/TPR STM to R knee to dec pain and gentle soft tissue mobility   Instrument Assisted STM     Mobilizations Knee gentle PA for pain relief and ROM(nt)   ROM/Flexibility    Myofascial Decompression    Ther. Exercise (35)  CPT 73997                   Y/N/G Total Time for Session 23-37 Minutes     Sets Reps Load Comment    PROGRESS NOTE/RE-EVAL/DISCHARGE NOTE ASSESSMENT        Quad sets n 1 10  10 sec hold   Standing marching  y 1 10 w/2# ankle wt     Hip ABD  y 1 10 w/2#  W/cues for technique    Hip ADD with ball y 1 20 3\" W/core engagement   Hip ext  y 1 10 5 sec hold W/glute squeeze at end range   Heel slides n 10 10 10\"    bridge        SAQ  n 1 20 3# 3 second hold   SLR h 1 10  10 sec hold   HS stretch n 1 3 20\" Supine with strap   Gastroc stretch n 1 10 20\" Wedge at steps, 5 sec hold   Quad iso  y 1 10  5 sec hold   HR        Prone quad h 1 10   stretch   Standing lean n 1 10 4'' step W/cues to lean into R knee    NuStep y   10' 5 min    Mechanical spine assessment     education     Biomechanical Assessment   PT provided continual visual assessment of mechanical faults throughout program, required manual and verbal cueing to improve joint biomechanics and normalize gross movement patterns.      BFR  BFR: EDUCATION ON INDICATIONS, LIMB CHANGES, POST TREATMENT EFFECTS, AND SIDE " EFFECTS.  VERBAL PERMISSION OBTAINED TO PERFORM TREATMENT.    BFR PARAMETERS     Neuro Re-Ed  CPT 90752   Y/N/G Total Time for Session 0-7 Minutes     Sets Reps Load Comment                           POSTURE EDUCATION  Lumbar biomechanics, derangement sx pattern, centralization vs peripheralization; frequency of FB must be reduced, HEP, use of lumbar roll   POSTURE EDUCATION Y     Avoid forward head and UCS extension. Pull shoulders back.  Fist example to achieve proper head position/elevation.   Cues for upright posture, chin tucks and hold it for 30 sec    Gait (10 min)  CPT 63568 Y/N/G Total Time for Session 8-22 Minutes    y W/cues for heel strike, weight shifting and arm swing              Ther. Activity(min)  CPT 19247 Y/N/G Total Time for Session 0-7 Minutes   EDUCATION h Stair training w/cues for weight shifting, sequence,safe hand placement.        Group  CPT 57897 Total Time for Session Not performed

## 2024-04-22 ENCOUNTER — HOSPITAL ENCOUNTER (OUTPATIENT)
Dept: PHYSICAL THERAPY | Age: 84
Setting detail: THERAPIES SERIES
Discharge: HOME | End: 2024-04-22
Attending: FAMILY MEDICINE
Payer: COMMERCIAL

## 2024-04-22 DIAGNOSIS — M25.561 ACUTE PAIN OF RIGHT KNEE: Primary | ICD-10-CM

## 2024-04-22 DIAGNOSIS — Z96.651 PRESENCE OF RIGHT ARTIFICIAL KNEE JOINT: ICD-10-CM

## 2024-04-22 PROCEDURE — 97110 THERAPEUTIC EXERCISES: CPT | Mod: GP,CQ

## 2024-04-22 PROCEDURE — 97010 HOT OR COLD PACKS THERAPY: CPT | Mod: GP,CQ

## 2024-04-22 NOTE — PROGRESS NOTES
Physical Therapy Visit    PT DAILY NOTE FOR OUTPATIENT THERAPY    Patient: Tre Maher Jr. MRN: [id number]  : 1940 84 y.o.  Referring Physician: Bob Ibarra DO  Date of Visit: 2024    Certification Dates: 24 through 24    Diagnosis:   1. Acute pain of right knee    2. Presence of right artificial knee joint        Chief Complaints:  R knee pain/soreness    Precautions:   Existing Precautions/Restrictions: no known precautions/restrictions      TODAY'S VISIT    Time In Session:  Start Time: 1450  Stop Time: 1550  Time Calculation (min): 60 min   History/Vitals/Pain/Encounter Info - 24 1447          Injury History/Precautions/Daily Required Info    Document Type daily treatment     Chief Complaint/Reason for Visit  R knee pain/soreness     Referring Physician DAWSON     Existing Precautions/Restrictions no known precautions/restrictions     History of present illness/functional impairment Several months ago, injured right knee doing leg press machine, then later, stood on ladder  x 1 hour and seemed to aggravate knee.  Knee had previous been a problem for past several years.   Worse:  first steps after sitting, step to up/down, can do step thru up stairs;  uses furniture around house, uses cane outside home (more recently); wakes from sound sleep with rolling; intermittent pain; trying to kick off shoes in posterior right knee., + bakers cyst     Patient/Family/Caregiver Comments/Observations Pt confirms name/.  rates R knee pain 5/10 upon arrival.     Patient reported fall since last visit No        Pain Assessment    Currently in pain Yes     Preferred Pain Scale number (Numeric Rating Pain Scale)     Pain Side/Orientation right     Pain: Body location Knee     Pain Rating (0-10): Pre Activity 4     Pain Description aching     Pain Onset/Duration worse with first steps after sitting, stairs, rolling in bed, trying to kick shoes off brings pain in posterior knee, resolves  "with sitting/rest, occasionally takes tylenol     Nonverbal Indicators of Pain activity pattern change     Pain Management Interventions heat applied;prescribed exercises encouraged        Pain Intervention    Intervention  See treatment log     Post Intervention Comments see assessment                    Daily Treatment Assessment and Plan - 04/22/24 1447          Daily Treatment Assessment and Plan    Daily Outcome Summary Tre forgot his cane today and entered department without AD.  Pain upon standing and initiation of gait.  Gait is antalgic with decreased knee flexion through swing phase.  Trialed up/down 6\" stairs and R knee buckled due to pain with loading.  R knee is warm to touch but not red.  AROM flexion is limited to approx 80 degrees with painful end range.  Tolerated increased reps for standing exs today.                         OBJECTIVE DATA TAKEN TODAY:    None taken    Today's Treatment:    *G=group; Y=completed; N = not completed; H= held; HEP= has been provided for HEP*    # manual and/or verbal cues applied to ensure proper technique and sequence       DOS    IE 4/2/2024   POC DATES 4/2/24-6/1/24   EXTENDED POC    30 Day 5/2/24   60 Day    90 Day    Precautions    Insurance Auth    Treatment  Current Session Time   Modalities (10) Total Time for Session 8-22 Minutes   Heat/Ice  CPT 72851 Moist heat R knee during supine there.ex   E. Stimulation Manual  CPT 72789    E. Stim Unattended  CPT 87943    MECHANICAL TRACTION  CPT 00129    Ultrasound  CPT 64249    Manual  CPT 86771 Total Time for Session Not performed   STM/MFR/TPR STM to R knee to dec pain and gentle soft tissue mobility   Instrument Assisted STM     Mobilizations Knee gentle PA for pain relief and ROM(nt)   ROM/Flexibility    Myofascial Decompression    Ther. Exercise (35)  CPT 45856                   Y/N/G Total Time for Session 38-52 Minutes     Sets Reps Load Comment    PROGRESS NOTE/RE-EVAL/DISCHARGE NOTE ASSESSMENT        Quad " "sets Y 2 10  5 sec hold   Standing marching  y 2 10 w/2# ankle wt     Hip ABD  y 2 10 w/2#  W/cues for technique    Hip ADD with ball y 1 20 3\" Seated W/core engagement   Hip ext  y 2 10 5 sec hold W/glute squeeze at end range   Heel slides n 10 10 10\"    bridge        SAQ  n 1 20 3# 3 second hold   SLR h 1 10  10 sec hold   HS stretch Y 1 3 20\" Supine with strap   Gastroc stretch Y 1 4 20\" Wedge at steps   Quad iso  n 1 10  5 sec hold   HR Y 2 10  @ 6\" step   Prone quad h 1 10   stretch   Standing lean n 1 10 4'' step W/cues to lean into R knee    NuStep y   10' 5 min    Mechanical spine assessment     education     Biomechanical Assessment   PT provided continual visual assessment of mechanical faults throughout program, required manual and verbal cueing to improve joint biomechanics and normalize gross movement patterns.      BFR  BFR: EDUCATION ON INDICATIONS, LIMB CHANGES, POST TREATMENT EFFECTS, AND SIDE EFFECTS.  VERBAL PERMISSION OBTAINED TO PERFORM TREATMENT.    BFR PARAMETERS     Neuro Re-Ed  CPT 36775   Y/N/G Total Time for Session Not performed     Sets Reps Load Comment                           POSTURE EDUCATION  Lumbar biomechanics, derangement sx pattern, centralization vs peripheralization; frequency of FB must be reduced, HEP, use of lumbar roll   POSTURE EDUCATION      Avoid forward head and UCS extension. Pull shoulders back.  Fist example to achieve proper head position/elevation.   Cues for upright posture, chin tucks and hold it for 30 sec    Gait   CPT 44924 Y/N/G Total Time for Session Not performed    y W/cues for heel strike, weight shifting and arm swing              Ther. Activity  CPT 50880 Y/N/G Total Time for Session Not performed   EDUCATION h Stair training w/cues for weight shifting, sequence,safe hand placement.        Group  CPT 09176 Total Time for Session Not performed                             "

## 2024-04-22 NOTE — OP PT TREATMENT LOG
"*G=group; Y=completed; N = not completed; H= held; HEP= has been provided for HEP*    # manual and/or verbal cues applied to ensure proper technique and sequence       DOS    IE 4/2/2024   POC DATES 4/2/24-6/1/24   EXTENDED POC    30 Day 5/2/24   60 Day    90 Day    Precautions    Insurance Auth    Treatment  Current Session Time   Modalities (10) Total Time for Session 8-22 Minutes   Heat/Ice  CPT 75024 Moist heat R knee during supine there.ex   E. Stimulation Manual  CPT 18940    E. Stim Unattended  CPT 87406    MECHANICAL TRACTION  CPT 58397    Ultrasound  CPT 05024    Manual  CPT 81684 Total Time for Session Not performed   STM/MFR/TPR STM to R knee to dec pain and gentle soft tissue mobility   Instrument Assisted STM     Mobilizations Knee gentle PA for pain relief and ROM(nt)   ROM/Flexibility    Myofascial Decompression    Ther. Exercise (35)  CPT 67341                   Y/N/G Total Time for Session 38-52 Minutes     Sets Reps Load Comment    PROGRESS NOTE/RE-EVAL/DISCHARGE NOTE ASSESSMENT        Quad sets Y 2 10  5 sec hold   Standing marching  y 2 10 w/2# ankle wt     Hip ABD  y 2 10 w/2#  W/cues for technique    Hip ADD with ball y 1 20 3\" Seated W/core engagement   Hip ext  y 2 10 5 sec hold W/glute squeeze at end range   Heel slides n 10 10 10\"    bridge        SAQ  n 1 20 3# 3 second hold   SLR h 1 10  10 sec hold   HS stretch Y 1 3 20\" Supine with strap   Gastroc stretch Y 1 4 20\" Wedge at steps   Quad iso  n 1 10  5 sec hold   HR Y 2 10  @ 6\" step   Prone quad h 1 10   stretch   Standing lean n 1 10 4'' step W/cues to lean into R knee    NuStep y   10' 5 min    Mechanical spine assessment     education     Biomechanical Assessment   PT provided continual visual assessment of mechanical faults throughout program, required manual and verbal cueing to improve joint biomechanics and normalize gross movement patterns.      BFR  BFR: EDUCATION ON INDICATIONS, LIMB CHANGES, POST TREATMENT EFFECTS, AND SIDE " EFFECTS.  VERBAL PERMISSION OBTAINED TO PERFORM TREATMENT.    BFR PARAMETERS     Neuro Re-Ed  CPT 48406   Y/N/G Total Time for Session Not performed     Sets Reps Load Comment                           POSTURE EDUCATION  Lumbar biomechanics, derangement sx pattern, centralization vs peripheralization; frequency of FB must be reduced, HEP, use of lumbar roll   POSTURE EDUCATION      Avoid forward head and UCS extension. Pull shoulders back.  Fist example to achieve proper head position/elevation.   Cues for upright posture, chin tucks and hold it for 30 sec    Gait   CPT 47655 Y/N/G Total Time for Session Not performed    y W/cues for heel strike, weight shifting and arm swing              Ther. Activity  CPT 86397 Y/N/G Total Time for Session Not performed   EDUCATION h Stair training w/cues for weight shifting, sequence,safe hand placement.        Group  CPT 30899 Total Time for Session Not performed

## 2024-10-17 NOTE — PROGRESS NOTES
Physical Therapy Visit    PT DAILY NOTE FOR OUTPATIENT THERAPY    Patient: Tre Maher Jr. MRN: [id number]  : 1940 84 y.o.  Referring Physician: Bob Ibarra DO  Date of Visit: 2024    Certification Dates: 24 through 24    Diagnosis:   1. Acute pain of right knee    2. Presence of right artificial knee joint        Chief Complaints:       Precautions:   Existing Precautions/Restrictions: no known precautions/restrictions      TODAY'S VISIT    Time In Session:  Start Time: 1257  Stop Time: 1355  Time Calculation (min): 58 min   History/Vitals/Pain/Encounter Info - 24 1301          Injury History/Precautions/Daily Required Info    Document Type daily treatment     Referring Physician DAWSON     Existing Precautions/Restrictions no known precautions/restrictions     History of present illness/functional impairment Several months ago, injured right knee doing leg press machine, then later, stood on ladder  x 1 hour and seemed to aggravate knee.  Knee had previous been a problem for past several years.   Worse:  first steps after sitting, step to up/down, can do step thru up stairs;  uses furniture around house, uses cane outside home (more recently); wakes from sound sleep with rolling; intermittent pain; trying to kick off shoes in posterior right knee., + bakers cyst     Patient/Family/Caregiver Comments/Observations Pt with no new complaints since last session. denies falls. No pain at rest. He reports most pain with going down stairs.     Patient reported fall since last visit No        Pain Assessment    Currently in pain Yes     Pain Side/Orientation right     Pain: Body location Knee     Pain Rating (0-10): Pre Activity 0        Pain Intervention    Intervention  MHP, TE     Post Intervention Comments Monitored                    Daily Treatment Assessment and Plan - 24 1301          Daily Treatment Assessment and Plan    Progress toward goals Progressing     Daily  "Outcome Summary Pt presents for first follow up session. Began session with MHP to R knee to promote muscle relaxation. Nustep performed for active warm up. Pt with complaints in increased pain at midrange knee ROM and no pain past that specific point. Therex initiated as outlined in treatment log. Overall patient with good tolerance toward exercises performed this session. Occ'l cues needed for form and to not push into pain on heel slides. Pt with mild quad lag during SLR. Had patient perform QS prior to SLR and lag was improved. Pain at worst during session 4/10. No pain at end of session.     Plan and Recommendations Cont with PT POC focusing on R knee ROM and strengthening. Consider mobs for increasing knee flexion. Add HEP including stretching and strengthening pending how patient's tolerance was toward treatment                         OBJECTIVE DATA TAKEN TODAY:    None taken    Today's Treatment:    *G=group; Y=completed; N = not completed; H= held; HEP= has been provided for HEP*    # manual and/or verbal cues applied to ensure proper technique and sequence       DOS    IE 4/2/2024   POC DATES 4/2/24-6/1/24   EXTENDED POC    30 Day 5/2/24   60 Day    90 Day    Precautions    Insurance Auth    Treatment  Current Session Time   Modalities Total Time for Session 8-22 Minutes   Heat/Ice  CPT 46253 Moist heat pre-activity R knee   E. Stimulation Manual  CPT 06452    E. Stim Unattended  CPT 69562    MECHANICAL TRACTION  CPT 38910    Ultrasound  CPT 85173    Manual   CPT 20043 Total Time for Session Not performed   STM/MFR/TPR STM to R knee for dec pain and inc soft tissue mobility   Instrument Assisted STM     Mobilizations    ROM/Flexibility    Myofascial Decompression    Ther. Exercise  CPT 41020                   Y/N/G Total Time for Session 38-52 Minutes     Sets Reps Load Comment    PROGRESS NOTE/RE-EVAL/DISCHARGE NOTE ASSESSMENT        Quad sets        Hip ABD         Hip ADD with ball Y 1 20 3\"    Heel " "slides Y 10 10 10\"    bridge        SAQ  Y 1 20 3# 3 second hold   SLR Y 1 20     HS stretch Y 1 3 20\" Supine with strap   Gastroc stretch y 1 4 20\" Wedge at steps           HR                        NuStep Y   10'    Mechanical spine assessment     education     Biomechanical Assessment   PT provided continual visual assessment of mechanical faults throughout program, required manual and verbal cueing to improve joint biomechanics and normalize gross movement patterns.      BFR  BFR: EDUCATION ON INDICATIONS, LIMB CHANGES, POST TREATMENT EFFECTS, AND SIDE EFFECTS.  VERBAL PERMISSION OBTAINED TO PERFORM TREATMENT.    BFR PARAMETERS     Neuro Re-Ed  CPT 15019   Y/N/G Total Time for Session Not performed     Sets Reps Load Comment                           POSTURE EDUCATION  Lumbar biomechanics, derangement sx pattern, centralization vs peripheralization; frequency of FB must be reduced, HEP, use of lumbar roll   POSTURE EDUCATION  Avoid forward head and UCS extension. Pull shoulders back.  Fist example to achieve proper head position/elevation.    Gait  CPT 54586 Y/N/G Total Time for Session Not performed                  Ther. Activity  CPT 79129 Y/N/G Total Time for Session Not performed   EDUCATION          Group  CPT 07767 Total Time for Session Not performed                             " 10

## 2024-11-04 ENCOUNTER — APPOINTMENT (OUTPATIENT)
Dept: URBAN - METROPOLITAN AREA CLINIC 203 | Age: 84
Setting detail: DERMATOLOGY
End: 2024-11-08

## 2024-11-04 DIAGNOSIS — D485 NEOPLASM OF UNCERTAIN BEHAVIOR OF SKIN: ICD-10-CM

## 2024-11-04 DIAGNOSIS — D18.0 HEMANGIOMA: ICD-10-CM

## 2024-11-04 DIAGNOSIS — L81.4 OTHER MELANIN HYPERPIGMENTATION: ICD-10-CM

## 2024-11-04 DIAGNOSIS — Z85.828 PERSONAL HISTORY OF OTHER MALIGNANT NEOPLASM OF SKIN: ICD-10-CM

## 2024-11-04 DIAGNOSIS — D22 MELANOCYTIC NEVI: ICD-10-CM

## 2024-11-04 DIAGNOSIS — L57.8 OTHER SKIN CHANGES DUE TO CHRONIC EXPOSURE TO NONIONIZING RADIATION: ICD-10-CM

## 2024-11-04 PROBLEM — D22.5 MELANOCYTIC NEVI OF TRUNK: Status: ACTIVE | Noted: 2024-11-04

## 2024-11-04 PROBLEM — D48.5 NEOPLASM OF UNCERTAIN BEHAVIOR OF SKIN: Status: ACTIVE | Noted: 2024-11-04

## 2024-11-04 PROBLEM — D18.01 HEMANGIOMA OF SKIN AND SUBCUTANEOUS TISSUE: Status: ACTIVE | Noted: 2024-11-04

## 2024-11-04 PROCEDURE — OTHER BIOPSY BY SHAVE METHOD: OTHER

## 2024-11-04 PROCEDURE — 99213 OFFICE O/P EST LOW 20 MIN: CPT | Mod: 25

## 2024-11-04 PROCEDURE — OTHER COUNSELING: OTHER

## 2024-11-04 PROCEDURE — OTHER SUNSCREEN RECOMMENDATIONS: OTHER

## 2024-11-04 PROCEDURE — OTHER REASSURANCE: OTHER

## 2024-11-04 PROCEDURE — 11102 TANGNTL BX SKIN SINGLE LES: CPT

## 2024-11-04 ASSESSMENT — LOCATION ZONE DERM
LOCATION ZONE: TRUNK
LOCATION ZONE: LEG

## 2024-11-04 ASSESSMENT — LOCATION SIMPLE DESCRIPTION DERM
LOCATION SIMPLE: ABDOMEN
LOCATION SIMPLE: UPPER BACK
LOCATION SIMPLE: LEFT PRETIBIAL REGION
LOCATION SIMPLE: LEFT UPPER BACK
LOCATION SIMPLE: LEFT BREAST
LOCATION SIMPLE: RIGHT LOWER LEG

## 2024-11-04 ASSESSMENT — LOCATION DETAILED DESCRIPTION DERM
LOCATION DETAILED: LEFT MEDIAL BREAST 11-12:00 REGION
LOCATION DETAILED: LEFT MEDIAL BREAST 10-11:00 REGION
LOCATION DETAILED: SUPERIOR THORACIC SPINE
LOCATION DETAILED: EPIGASTRIC SKIN
LOCATION DETAILED: LEFT PROXIMAL PRETIBIAL REGION
LOCATION DETAILED: RIGHT LATERAL PROXIMAL CALF
LOCATION DETAILED: LEFT SUPERIOR MEDIAL UPPER BACK

## 2024-11-04 NOTE — PROCEDURE: BIOPSY BY SHAVE METHOD
Detail Level: Detailed
Depth Of Biopsy: dermis
Was A Bandage Applied: Yes
Size Of Lesion In Cm: 0
Biopsy Type: H and E
Biopsy Method: Dermablade
Anesthesia Type: 1% lidocaine with epinephrine
Anesthesia Volume In Cc: 0.5
Hemostasis: Drysol
Wound Care: Petrolatum
Dressing: bandage
Destruction After The Procedure: No
Type Of Destruction Used: Curettage
Curettage Text: The wound bed was treated with curettage after the biopsy was performed.
Cryotherapy Text: The wound bed was treated with cryotherapy after the biopsy was performed.
Electrodesiccation Text: The wound bed was treated with electrodesiccation after the biopsy was performed.
Electrodesiccation And Curettage Text: The wound bed was treated with electrodesiccation and curettage after the biopsy was performed.
Silver Nitrate Text: The wound bed was treated with silver nitrate after the biopsy was performed.
Lab: -[id number]
Consent: Written consent was obtained and risks were reviewed including but not limited to scarring, infection, bleeding, scabbing, incomplete removal, nerve damage and allergy to anesthesia.
Post-Care Instructions: I reviewed with the patient in detail post-care instructions. Patient is to keep the biopsy site dry overnight, and then apply bacitracin twice daily until healed. Patient may apply hydrogen peroxide soaks to remove any crusting.
Notification Instructions: Patient will be notified of biopsy results. However, patient instructed to call the office if not contacted within 2 weeks.
Billing Type: Third-Party Bill
Information: Selecting Yes will display possible errors in your note based on the variables you have selected. This validation is only offered as a suggestion for you. PLEASE NOTE THAT THE VALIDATION TEXT WILL BE REMOVED WHEN YOU FINALIZE YOUR NOTE. IF YOU WANT TO FAX A PRELIMINARY NOTE YOU WILL NEED TO TOGGLE THIS TO 'NO' IF YOU DO NOT WANT IT IN YOUR FAXED NOTE.

## 2024-11-12 ENCOUNTER — APPOINTMENT (OUTPATIENT)
Dept: URBAN - METROPOLITAN AREA CLINIC 203 | Age: 84
Setting detail: DERMATOLOGY
End: 2024-11-12

## 2024-11-12 PROBLEM — C44.722 SQUAMOUS CELL CARCINOMA OF SKIN OF RIGHT LOWER LIMB, INCLUDING HIP: Status: ACTIVE | Noted: 2024-11-12

## 2024-11-12 PROCEDURE — OTHER REFERRAL: OTHER

## 2024-11-15 ENCOUNTER — TRANSCRIBE ORDERS (OUTPATIENT)
Dept: SCHEDULING | Age: 84
End: 2024-11-15

## 2024-11-15 DIAGNOSIS — T84.84XA PAIN DUE TO INTERNAL ORTHOPEDIC PROSTHETIC DEVICES, IMPLANTS AND GRAFTS, INITIAL ENCOUNTER (CMS/HCC): ICD-10-CM

## 2024-11-15 DIAGNOSIS — M25.561 PAIN IN RIGHT KNEE: Primary | ICD-10-CM

## 2024-11-15 DIAGNOSIS — T85.698A OTHER MECHANICAL COMPLICATION OF OTHER SPECIFIED INTERNAL PROSTHETIC DEVICES, IMPLANTS AND GRAFTS, INITIAL ENCOUNTER: ICD-10-CM

## 2024-11-21 ENCOUNTER — HOSPITAL ENCOUNTER (OUTPATIENT)
Dept: RADIOLOGY | Facility: HOSPITAL | Age: 84
Setting detail: NUCLEAR MEDICINE
Discharge: HOME | End: 2024-11-21
Attending: ORTHOPAEDIC SURGERY
Payer: COMMERCIAL

## 2024-11-21 DIAGNOSIS — T84.84XA PAIN DUE TO INTERNAL ORTHOPEDIC PROSTHETIC DEVICES, IMPLANTS AND GRAFTS, INITIAL ENCOUNTER (CMS/HCC): ICD-10-CM

## 2024-11-21 DIAGNOSIS — M25.561 PAIN IN RIGHT KNEE: ICD-10-CM

## 2024-11-21 DIAGNOSIS — T85.698A OTHER MECHANICAL COMPLICATION OF OTHER SPECIFIED INTERNAL PROSTHETIC DEVICES, IMPLANTS AND GRAFTS, INITIAL ENCOUNTER: ICD-10-CM

## 2024-11-21 PROCEDURE — 78315 BONE IMAGING 3 PHASE: CPT

## 2024-11-21 PROCEDURE — A9503 TC99M MEDRONATE: HCPCS | Performed by: ORTHOPAEDIC SURGERY

## 2024-11-21 RX ADMIN — TECHNETIUM TC 99M MEDRONATE 27.5 MILLICURIE: 25 INJECTION, POWDER, FOR SOLUTION INTRAVENOUS at 10:06

## 2025-01-22 ENCOUNTER — TRANSCRIBE ORDERS (OUTPATIENT)
Dept: SCHEDULING | Age: 85
End: 2025-01-22

## 2025-01-22 DIAGNOSIS — R31.0 GROSS HEMATURIA: Primary | ICD-10-CM

## 2025-02-03 ENCOUNTER — HOSPITAL ENCOUNTER (OUTPATIENT)
Dept: RADIOLOGY | Age: 85
Discharge: HOME | End: 2025-02-03
Attending: UROLOGY
Payer: COMMERCIAL

## 2025-02-03 DIAGNOSIS — R31.0 GROSS HEMATURIA: ICD-10-CM

## 2025-02-03 PROCEDURE — 63600105 HC IODINE BASED CONTRAST: Mod: JZ | Performed by: UROLOGY

## 2025-02-03 PROCEDURE — 74178 CT ABD&PLV WO CNTR FLWD CNTR: CPT

## 2025-02-03 RX ORDER — IOPAMIDOL 755 MG/ML
100 INJECTION, SOLUTION INTRAVASCULAR
Status: COMPLETED | OUTPATIENT
Start: 2025-02-03 | End: 2025-02-03

## 2025-02-03 RX ADMIN — IOPAMIDOL 100 ML: 755 INJECTION, SOLUTION INTRAVENOUS at 13:02

## 2025-02-12 ENCOUNTER — TRANSCRIBE ORDERS (OUTPATIENT)
Dept: PREADMISSION TESTING | Facility: HOSPITAL | Age: 85
End: 2025-02-12

## 2025-02-12 DIAGNOSIS — T84.84XA PAIN DUE TO INTERNAL ORTHOPEDIC PROSTHETIC DEVICES, IMPLANTS AND GRAFTS, INITIAL ENCOUNTER (CMS/HCC): Primary | ICD-10-CM

## 2025-02-12 NOTE — DISCHARGE INSTRUCTIONS
DVT Prophylaxis:  - Continue with Xarelto 10mg daily through Sunday February 23  - On Monday February 24, you may resume Xarelto 20mg daily.       50% weight bearing to right leg in brace while using walker. Hinged knee brace locked in extension for 6-8 weeks. NO knee range of motion.        Constipation/ Pain Med:   - Take your pain medication with food to prevent nausea  - Do not drive while taking pain medications.   - Pain medications may cause constipation.   - Drink plenty of fluids and eat fresh fruits and vegetables.  - Please take Senna-Colace as prescribed. Hold for loose stool. If you are having difficulties having a bowel movement or haven't had bowel movement in 2 days, please add over the counter miralax and take as directed on package.   - If you have not had a bowel movement in three days please call the office.    Incision Care:   - On February 23, you may take off your dressing and leave your incision open to air.   - However, if there is any drainage please keep covered with gauze and tape.  - Please keep your incision(s) clean and dry  - Make sure your incision(s) are checked at least twice daily for signs and symptoms of infection.   If any of the below should occur, please call the office:  - Drainage from incision site  - Opening of incisions  - Fevers greater than 101  - Flu-like symptoms  - Increased redness and/or tenderness  Please call office or go to emergency department if :  - Thigh/calf pain or swelling in legs  - Chest pain  - Chest congestion  - Problems with breathing.

## 2025-02-13 ENCOUNTER — HOSPITAL ENCOUNTER (OUTPATIENT)
Dept: CARDIOLOGY | Facility: HOSPITAL | Age: 85
Discharge: HOME | End: 2025-02-13
Attending: ORTHOPAEDIC SURGERY
Payer: COMMERCIAL

## 2025-02-13 ENCOUNTER — PRE-ADMISSION TESTING (OUTPATIENT)
Dept: PREADMISSION TESTING | Facility: HOSPITAL | Age: 85
End: 2025-02-13
Attending: ORTHOPAEDIC SURGERY
Payer: COMMERCIAL

## 2025-02-13 VITALS
RESPIRATION RATE: 14 BRPM | WEIGHT: 166 LBS | TEMPERATURE: 97.8 F | BODY MASS INDEX: 26.68 KG/M2 | DIASTOLIC BLOOD PRESSURE: 60 MMHG | HEART RATE: 88 BPM | HEIGHT: 66 IN | SYSTOLIC BLOOD PRESSURE: 132 MMHG | OXYGEN SATURATION: 98 %

## 2025-02-13 DIAGNOSIS — I48.0 PAROXYSMAL ATRIAL FIBRILLATION (CMS/HCC): ICD-10-CM

## 2025-02-13 DIAGNOSIS — I25.10 CORONARY ARTERY DISEASE INVOLVING NATIVE CORONARY ARTERY OF NATIVE HEART, UNSPECIFIED WHETHER ANGINA PRESENT: ICD-10-CM

## 2025-02-13 DIAGNOSIS — Z01.818 ENCOUNTER FOR PREOPERATIVE ASSESSMENT: Primary | ICD-10-CM

## 2025-02-13 DIAGNOSIS — I35.0 NONRHEUMATIC AORTIC VALVE STENOSIS: ICD-10-CM

## 2025-02-13 DIAGNOSIS — J45.30 MILD PERSISTENT ASTHMA WITHOUT COMPLICATION: ICD-10-CM

## 2025-02-13 DIAGNOSIS — T84.84XA PAIN DUE TO INTERNAL ORTHOPEDIC PROSTHETIC DEVICES, IMPLANTS AND GRAFTS, INITIAL ENCOUNTER (CMS/HCC): ICD-10-CM

## 2025-02-13 DIAGNOSIS — I10 ESSENTIAL HYPERTENSION: ICD-10-CM

## 2025-02-13 DIAGNOSIS — G89.29 CHRONIC PAIN OF RIGHT KNEE: ICD-10-CM

## 2025-02-13 DIAGNOSIS — M25.561 CHRONIC PAIN OF RIGHT KNEE: ICD-10-CM

## 2025-02-13 LAB
ABO + RH BLD: NORMAL
ALBUMIN SERPL-MCNC: 4.1 G/DL (ref 3.5–5.7)
ALP SERPL-CCNC: 80 IU/L (ref 34–125)
ALT SERPL-CCNC: 18 IU/L (ref 7–52)
ANION GAP SERPL CALC-SCNC: 8 MEQ/L (ref 3–15)
AST SERPL-CCNC: 26 IU/L (ref 13–39)
BILIRUB SERPL-MCNC: 1 MG/DL (ref 0.3–1.2)
BLD GP AB SCN SERPL QL: NEGATIVE
BLOOD BANK CMNT PATIENT-IMP: NORMAL
BUN SERPL-MCNC: 20 MG/DL (ref 7–25)
CALCIUM SERPL-MCNC: 9.4 MG/DL (ref 8.6–10.3)
CHLORIDE SERPL-SCNC: 102 MEQ/L (ref 98–107)
CO2 SERPL-SCNC: 28 MEQ/L (ref 21–31)
CREAT SERPL-MCNC: 0.8 MG/DL (ref 0.7–1.3)
D AG BLD QL: POSITIVE
EGFRCR SERPLBLD CKD-EPI 2021: >60 ML/MIN/1.73M*2
ERYTHROCYTE [DISTWIDTH] IN BLOOD BY AUTOMATED COUNT: 15.3 % (ref 11.6–14.4)
GLUCOSE SERPL-MCNC: 86 MG/DL (ref 70–99)
HCT VFR BLD AUTO: 36.5 % (ref 40.1–51)
HGB BLD-MCNC: 11.5 G/DL (ref 13.7–17.5)
LABORATORY COMMENT REPORT: NORMAL
MCH RBC QN AUTO: 29.2 PG (ref 28–33.2)
MCHC RBC AUTO-ENTMCNC: 31.5 G/DL (ref 32.2–36.5)
MCV RBC AUTO: 92.6 FL (ref 83–98)
PLATELET # BLD AUTO: 279 K/UL (ref 150–350)
PMV BLD AUTO: 8.6 FL (ref 9.4–12.4)
POTASSIUM SERPL-SCNC: 4.3 MEQ/L (ref 3.5–5.1)
PROT SERPL-MCNC: 7.5 G/DL (ref 6–8.2)
RBC # BLD AUTO: 3.94 M/UL (ref 4.5–5.8)
SODIUM SERPL-SCNC: 138 MEQ/L (ref 136–145)
SPECIMEN EXP DATE BLD: NORMAL
WBC # BLD AUTO: 6.8 K/UL (ref 3.8–10.5)

## 2025-02-13 PROCEDURE — 99215 OFFICE O/P EST HI 40 MIN: CPT | Performed by: HOSPITALIST

## 2025-02-13 PROCEDURE — 36415 COLL VENOUS BLD VENIPUNCTURE: CPT

## 2025-02-13 PROCEDURE — 86850 RBC ANTIBODY SCREEN: CPT

## 2025-02-13 PROCEDURE — 85027 COMPLETE CBC AUTOMATED: CPT

## 2025-02-13 PROCEDURE — 80053 COMPREHEN METABOLIC PANEL: CPT

## 2025-02-13 RX ORDER — EZETIMIBE 10 MG/1
10 TABLET ORAL DAILY
COMMUNITY

## 2025-02-13 RX ORDER — ASPIRIN 81 MG/1
81 TABLET ORAL DAILY
COMMUNITY

## 2025-02-13 RX ORDER — LANOLIN ALCOHOL/MO/W.PET/CERES
1000 CREAM (GRAM) TOPICAL DAILY
COMMUNITY

## 2025-02-13 ASSESSMENT — PAIN SCALES - GENERAL: PAINLEVEL_OUTOF10: 0-NO PAIN

## 2025-02-13 NOTE — ASSESSMENT & PLAN NOTE
Mild to moderate per echo done by his cardiologist in 2024  Cleared for surgery by cardiologist, note in EPIC

## 2025-02-13 NOTE — ASSESSMENT & PLAN NOTE
-Patient has no cardiac symptoms.   -Patient exhibits no signs/symptoms of angina, arrythmia or decompenstated CHF.   -Patient does not have new significant EKG changes  -Patient has been advised to avoid NSAIDs 5-7 days prior to surgery unless otherwise directed by surgeon  -Evaluated and cleared for surgery by his cardiologist on 2/11/2024, note in epic    ? Revised Cardiac Index = 1   Interpretation: This score belongs to Class II of risk for perioperative cardiac events with a risk percentage of 0.9%. Given by history of CAD  Patient is medically optimal for surgery.

## 2025-02-13 NOTE — ASSESSMENT & PLAN NOTE
Persistent  On xarelto   Not on any heart rate control medication  Patient was recommended to transition to lovenox bid 3 days prior to surgery per his cardiologist  He will be off lovenox for 12hrs prior to surgery

## 2025-02-13 NOTE — H&P (VIEW-ONLY)
Division of Hospital Medicine -  Pre-Operative Consultation       Patient Name: Bertrand Maher Jr.  Referring Surgeon: Jurgen Dow    Reason for Referral: Pre-Operative Evaluation  Surgical Procedure:  KNEE TOTAL REVISION  Operative Date: 2/18/2025  Other Providers:      PCP: Bob Ibarra DO        HISTORY OF PRESENT ILLNESS      Bertrand Maher Jr. is a 84 y.o. male presenting today to the [facility] at [facility] for pre-operative evaluation prior to planned surgery.    In regards to medical history:  CAD  HLD  Afib on xarelto  TIA  BPH  Mild intermittent asthma  Aortic stenosis    The patient denies any current or recent chest pain or pressure, dyspnea, cough, sputum, fevers, chills, abdominal pain, nausea, vomiting, diarrhea or other symptoms.     Functionally, the patient is able to ascend a flight or so of stairs with no dyspnea or chest pain.     The patient denies, on specific questioning, the following:  No history of HÉCTOR.  No history of DVT/PE.  No history of COPD.  No history of CVA.  No history of DM.   No history of CKD.     PAST MEDICAL AND SURGICAL HISTORY      Past Medical History:   Diagnosis Date    Allergic rhinitis     Anemia     Asthma     takes singulair no inhalers    Atrial fibrillation (CMS/HCC)     BPH (benign prostatic hyperplasia)     Cataract     Cervical pain     Cervical spondylosis     Coronary artery stenosis     HOLM (dyspnea on exertion)     H/O heart artery stent     x2 stents    Hematuria 01/2025    needs bx Dr Simone Franklin    History of knee replacement     Hypercholesterolemia     Lumbar pain     Lumbar spondylosis     Osteoarthritis of hip     Osteoarthritis, hand     Other hypertrophic osteoarthropathy, multiple sites     Prostate cancer (CMS/HCC) 2022    radiation    Skin cancer     right calf    Status post angioplasty     TIA (transient ischemic attack) 2023    garbled speech, no residual    Urge incontinence   "      Past Surgical History   Procedure Laterality Date    Basal cell carcinoma excision  08/04/2000    Cardiac catheterization N/A 10/22/2004    Cardiac catheterization  08/05/2021    Cardiac surgery  05/18/2021    \"heart procedure\"    Coronary stent placement  10/22/2004    x2    Eye surgery Right 09/21/2017    laser    Mohs surgery      Replacement total knee Left 06/01/1994    Replacement total knee Right 03/09/1994    Revision total knee arthroplasty Right 2015    2nd REVISION 2017    Shoulder surgery Right 01/06/2005    Shoulder surgery Left 01/12/1989    Total hip arthroplasty Right 2017       MEDICATIONS        Current Outpatient Medications:     aspirin 81 mg enteric coated tablet, Take 81 mg by mouth daily., Disp: , Rfl:     calcium carbonate/vitamin D3 (CALCIUM 500 + D, D3, ORAL), Take by mouth daily. Calcium 500mg D3 25mcg, Disp: , Rfl:     cyanocobalamin (VITAMIN B12) 1,000 mcg tablet, Take 1,000 mcg by mouth daily., Disp: , Rfl:     ezetimibe (ZETIA) 10 mg tablet, Take 10 mg by mouth daily., Disp: , Rfl:     montelukast (SINGULAIR) 10 mg tablet, Take 1 tablet (10 mg total) by mouth nightly., Disp: 90 tablet, Rfl: 3    multivitamin tablet, Take by mouth daily., Disp: , Rfl:     omega-3 acid ethyl esters (LOVAZA) 1 gram capsule, Take 1,000 mg by mouth daily., Disp: , Rfl:     rosuvastatin (CRESTOR) 40 mg tablet, Take 1 tablet (40 mg total) by mouth once daily., Disp: 90 tablet, Rfl: 3    tamsulosin (FLOMAX) 0.4 mg capsule, Take 0.4 mg by mouth., Disp: , Rfl:     XARELTO 20 mg tablet, TAKE 1 TABLET BY MOUTH EVERY DAY WITH DINNER, Disp: , Rfl: 2    ALLERGIES      Apixaban and Oxycodone    FAMILY HISTORY      family history is not on file.    Denies any prior known family history of DVTs/PEs/clotting disorder    SOCIAL HISTORY      Social History     Tobacco Use    Smoking status: Never    Smokeless tobacco: Never   Substance Use Topics    Alcohol use: Yes     Alcohol/week: 14.0 standard drinks of alcohol " "    Types: 14 Standard drinks or equivalent per week    Drug use: Never       REVIEW OF SYSTEMS      All other systems reviewed and negative except as noted in HPI    PHYSICAL EXAMINATION      Visit Vitals  /60 (BP Location: Left upper arm, Patient Position: Sitting)   Pulse 88   Temp 36.6 °C (97.8 °F) (Temporal)   Resp 14   Ht 1.676 m (5' 6\")   Wt 75.3 kg (166 lb)   SpO2 98%   BMI 26.79 kg/m²     Body mass index is 26.79 kg/m².    Physical Exam  GENERAL APPEARANCE  Awake, alert    MUCUS MEMBRANES  Moist    LUNGS  CTAB, no w/r/r    CHEST  RRR, midsystolic murmur    ABDOMEN  Soft, NT, ND, +BS    EXTREMITIES  No c/c/e    SKIN  No obvious rash    HEENT  Anicteric    NEURO  Moving all extremities, Ox3, coherent, no dysarthria        LABS / EKG        Labs  Results from last 7 days   Lab Units 02/13/25  1437   WBC K/uL 6.80   HEMOGLOBIN g/dL 11.5*   HEMATOCRIT % 36.5*   PLATELETS K/uL 279    Results from last 7 days   Lab Units 02/13/25  1437   SODIUM mEQ/L 138   POTASSIUM mEQ/L 4.3   CHLORIDE mEQ/L 102   CO2 mEQ/L 28   BUN mg/dL 20   CREATININE mg/dL 0.8   GLUCOSE mg/dL 86                        Results from last 7 days   Lab Units 02/13/25  1437   AST IU/L 26   ALT IU/L 18   ALK PHOS IU/L 80   BILIRUBIN TOTAL mg/dL 1.0   PROTEIN TOTAL g/dL 7.5   ALBUMIN g/dL 4.1                   Microbiology Results       ** No results found for the last 720 hours. **               ECG/Telemetry  2/11/2025 at his cardiologist's office  A flutter with LVH and anterior septal infarct    ASSESSMENT AND PLAN         Asthma  Mild intermittent  Not in exacerbation    Atrial fibrillation (CMS/HCC)  Persistent  On xarelto   Not on any heart rate control medication  Patient was recommended to transition to lovenox bid 3 days prior to surgery per his cardiologist  He will be off lovenox for 12hrs prior to surgery    CAD (coronary artery disease)  H/o of stent  on asa, advised by cardiologist not to stop for surgery  On statin   Cleared by " cardiologist for surgery    Chronic pain of right knee  For revision    Essential hypertension  On sotalol    Encounter for preoperative assessment  -Patient has no cardiac symptoms.   -Patient exhibits no signs/symptoms of angina, arrythmia or decompenstated CHF.   -Patient does not have new significant EKG changes  -Patient has been advised to avoid NSAIDs 5-7 days prior to surgery unless otherwise directed by surgeon  -Evaluated and cleared for surgery by his cardiologist on 2/11/2024, note in epic    ? Revised Cardiac Index = 1   Interpretation: This score belongs to Class II of risk for perioperative cardiac events with a risk percentage of 0.9%. Given by history of CAD  Patient is medically optimal for surgery.        Aortic stenosis  Mild to moderate per echo done by his cardiologist in 2024  Cleared for surgery by cardiologist, note in EPIC       Further comments:  Resume supplements when OK with surgical team.  I would encourage incentive spirometry to assist with minimizing micheal-operative pulmonary risk.  DVT prophylaxis and timing of such per the discretion of the surgeon.     Please do not hesitate to contact [facility] during the upcoming hospitalization with any questions or concerns.     Lena Lugo MD  2/13/2025

## 2025-02-13 NOTE — H&P
Division of Hospital Medicine -  Pre-Operative Consultation       Patient Name: Bertrand Maher Jr.  Referring Surgeon: Jurgen Dow    Reason for Referral: Pre-Operative Evaluation  Surgical Procedure:  KNEE TOTAL REVISION  Operative Date: 2/18/2025  Other Providers:      PCP: Bob Ibarra DO        HISTORY OF PRESENT ILLNESS      Bertrand Maher Jr. is a 84 y.o. male presenting today to the [facility] at [facility] for pre-operative evaluation prior to planned surgery.    In regards to medical history:  CAD  HLD  Afib on xarelto  TIA  BPH  Mild intermittent asthma  Aortic stenosis    The patient denies any current or recent chest pain or pressure, dyspnea, cough, sputum, fevers, chills, abdominal pain, nausea, vomiting, diarrhea or other symptoms.     Functionally, the patient is able to ascend a flight or so of stairs with no dyspnea or chest pain.     The patient denies, on specific questioning, the following:  No history of HÉCTOR.  No history of DVT/PE.  No history of COPD.  No history of CVA.  No history of DM.   No history of CKD.     PAST MEDICAL AND SURGICAL HISTORY      Past Medical History:   Diagnosis Date    Allergic rhinitis     Anemia     Asthma     takes singulair no inhalers    Atrial fibrillation (CMS/HCC)     BPH (benign prostatic hyperplasia)     Cataract     Cervical pain     Cervical spondylosis     Coronary artery stenosis     HOLM (dyspnea on exertion)     H/O heart artery stent     x2 stents    Hematuria 01/2025    needs bx Dr Simone Franklin    History of knee replacement     Hypercholesterolemia     Lumbar pain     Lumbar spondylosis     Osteoarthritis of hip     Osteoarthritis, hand     Other hypertrophic osteoarthropathy, multiple sites     Prostate cancer (CMS/HCC) 2022    radiation    Skin cancer     right calf    Status post angioplasty     TIA (transient ischemic attack) 2023    garbled speech, no residual    Urge incontinence   "      Past Surgical History   Procedure Laterality Date    Basal cell carcinoma excision  08/04/2000    Cardiac catheterization N/A 10/22/2004    Cardiac catheterization  08/05/2021    Cardiac surgery  05/18/2021    \"heart procedure\"    Coronary stent placement  10/22/2004    x2    Eye surgery Right 09/21/2017    laser    Mohs surgery      Replacement total knee Left 06/01/1994    Replacement total knee Right 03/09/1994    Revision total knee arthroplasty Right 2015    2nd REVISION 2017    Shoulder surgery Right 01/06/2005    Shoulder surgery Left 01/12/1989    Total hip arthroplasty Right 2017       MEDICATIONS        Current Outpatient Medications:     aspirin 81 mg enteric coated tablet, Take 81 mg by mouth daily., Disp: , Rfl:     calcium carbonate/vitamin D3 (CALCIUM 500 + D, D3, ORAL), Take by mouth daily. Calcium 500mg D3 25mcg, Disp: , Rfl:     cyanocobalamin (VITAMIN B12) 1,000 mcg tablet, Take 1,000 mcg by mouth daily., Disp: , Rfl:     ezetimibe (ZETIA) 10 mg tablet, Take 10 mg by mouth daily., Disp: , Rfl:     montelukast (SINGULAIR) 10 mg tablet, Take 1 tablet (10 mg total) by mouth nightly., Disp: 90 tablet, Rfl: 3    multivitamin tablet, Take by mouth daily., Disp: , Rfl:     omega-3 acid ethyl esters (LOVAZA) 1 gram capsule, Take 1,000 mg by mouth daily., Disp: , Rfl:     rosuvastatin (CRESTOR) 40 mg tablet, Take 1 tablet (40 mg total) by mouth once daily., Disp: 90 tablet, Rfl: 3    tamsulosin (FLOMAX) 0.4 mg capsule, Take 0.4 mg by mouth., Disp: , Rfl:     XARELTO 20 mg tablet, TAKE 1 TABLET BY MOUTH EVERY DAY WITH DINNER, Disp: , Rfl: 2    ALLERGIES      Apixaban and Oxycodone    FAMILY HISTORY      family history is not on file.    Denies any prior known family history of DVTs/PEs/clotting disorder    SOCIAL HISTORY      Social History     Tobacco Use    Smoking status: Never    Smokeless tobacco: Never   Substance Use Topics    Alcohol use: Yes     Alcohol/week: 14.0 standard drinks of alcohol " "    Types: 14 Standard drinks or equivalent per week    Drug use: Never       REVIEW OF SYSTEMS      All other systems reviewed and negative except as noted in HPI    PHYSICAL EXAMINATION      Visit Vitals  /60 (BP Location: Left upper arm, Patient Position: Sitting)   Pulse 88   Temp 36.6 °C (97.8 °F) (Temporal)   Resp 14   Ht 1.676 m (5' 6\")   Wt 75.3 kg (166 lb)   SpO2 98%   BMI 26.79 kg/m²     Body mass index is 26.79 kg/m².    Physical Exam  GENERAL APPEARANCE  Awake, alert    MUCUS MEMBRANES  Moist    LUNGS  CTAB, no w/r/r    CHEST  RRR, midsystolic murmur    ABDOMEN  Soft, NT, ND, +BS    EXTREMITIES  No c/c/e    SKIN  No obvious rash    HEENT  Anicteric    NEURO  Moving all extremities, Ox3, coherent, no dysarthria        LABS / EKG        Labs  Results from last 7 days   Lab Units 02/13/25  1437   WBC K/uL 6.80   HEMOGLOBIN g/dL 11.5*   HEMATOCRIT % 36.5*   PLATELETS K/uL 279    Results from last 7 days   Lab Units 02/13/25  1437   SODIUM mEQ/L 138   POTASSIUM mEQ/L 4.3   CHLORIDE mEQ/L 102   CO2 mEQ/L 28   BUN mg/dL 20   CREATININE mg/dL 0.8   GLUCOSE mg/dL 86                        Results from last 7 days   Lab Units 02/13/25  1437   AST IU/L 26   ALT IU/L 18   ALK PHOS IU/L 80   BILIRUBIN TOTAL mg/dL 1.0   PROTEIN TOTAL g/dL 7.5   ALBUMIN g/dL 4.1                   Microbiology Results       ** No results found for the last 720 hours. **               ECG/Telemetry  2/11/2025 at his cardiologist's office  A flutter with LVH and anterior septal infarct    ASSESSMENT AND PLAN         Asthma  Mild intermittent  Not in exacerbation    Atrial fibrillation (CMS/HCC)  Persistent  On xarelto   Not on any heart rate control medication  Patient was recommended to transition to lovenox bid 3 days prior to surgery per his cardiologist  He will be off lovenox for 12hrs prior to surgery    CAD (coronary artery disease)  H/o of stent  on asa, advised by cardiologist not to stop for surgery  On statin   Cleared by " cardiologist for surgery    Chronic pain of right knee  For revision    Essential hypertension  On sotalol    Encounter for preoperative assessment  -Patient has no cardiac symptoms.   -Patient exhibits no signs/symptoms of angina, arrythmia or decompenstated CHF.   -Patient does not have new significant EKG changes  -Patient has been advised to avoid NSAIDs 5-7 days prior to surgery unless otherwise directed by surgeon  -Evaluated and cleared for surgery by his cardiologist on 2/11/2024, note in epic    ? Revised Cardiac Index = 1   Interpretation: This score belongs to Class II of risk for perioperative cardiac events with a risk percentage of 0.9%. Given by history of CAD  Patient is medically optimal for surgery.        Aortic stenosis  Mild to moderate per echo done by his cardiologist in 2024  Cleared for surgery by cardiologist, note in EPIC       Further comments:  Resume supplements when OK with surgical team.  I would encourage incentive spirometry to assist with minimizing micheal-operative pulmonary risk.  DVT prophylaxis and timing of such per the discretion of the surgeon.     Please do not hesitate to contact [facility] during the upcoming hospitalization with any questions or concerns.     Lena Lugo MD  2/13/2025

## 2025-02-13 NOTE — ASSESSMENT & PLAN NOTE
H/o of stent  on asa, advised by cardiologist not to stop for surgery  On statin   Cleared by cardiologist for surgery

## 2025-02-13 NOTE — PRE-PROCEDURE INSTRUCTIONS
[facility]  [address] [place] [address]    1. Admissions will call you with your arrival time on  February 17, 2025 (day prior to surgery) between 2pm-4pm.  For questions about your arrival time, please call [phone].     2. On the day of your procedure please report to the [facility] in the [place]. Please arrive through the [place] [facility] Entrance.  If you are parking in the [facility] Parking [facility]age, come to the ground floor of the garage and follow signs to the [facility].  After being screened, please report to either the Outpatient Registration for Pre-Admission Testing or to the Surgery Registration Desk.      3. Please follow the following fasting guidelines:     No solid food EIGHT HOURS prior to arrival time on day of surgery.  6 ounces of clear liquids, meaning water or PLAIN black coffee WITHOUT any milk, cream, sugar, or sweetener are permitted up to TWO HOURS prior to arrival at the hospital.    4. Please take ONLY the following medications with a sip of water on the morning of your procedure: (populate names and/or NONE)  Stop Xarelto 2/16/25 per Cardiologist (bridging with Lovenox)  Tamsulosin and aspirin  Stop all supplements 1 week prior to surgery    5. Other Instructions: You may brush your teeth the morning of the procedure. Rinse and spit, do not swallow.  Bring a list of your medications with dosages.  Use surgical wash as directed.     [facility]  Patient Education Preoperative Showers    Good hygiene, such as frequent handwashing and daily skin cleansing, promotes good health. Daily skin cleansing helps remove germs that may cause infections. The following instructions should be followed to help reduce germs on your skin prior to your surgical procedure.     Bactoshield/Hibiclens CHG 4% is an antiseptic soap. The active ingredient is chlorhexidine gluconate. Do not use this product if you are allergic to chlorhexidine gluconate.  February 16 x2,  February 17th x2 and the MORNING of your procedure, shower or bathe with Bactoshield. This product should replace your regular soap used for cleansing most of your body surfaces. Bactoshield should not be used on your head or face; keep out of your eyes, ears and mouth.  If you plan to wash your hair, do so with your regular shampoo. Then, rinse the hair and your body thoroughly to remove any shampoo residue.  Wash your face with regular soap and water only.  Wash your genital area with soap and water only.  Thoroughly rinse your body with warm water from the neck down.  Apply the minimum amount of Bactoshield to cover the skin. Use this product as you would any liquid soap. Leave this on for 2 minutes. NOTE- Bactoshield DOES NOT LATHER like normal soap.   Wash the skin gently and rinse thoroughly with warm water. You do not need to scrub the skin to remove germs.  Do not use regular soap after you have applied and rinsed the Bactoshield.  Change into clean clothes after each shower.   Do not apply any lotions, powders, or perfumes to the body areas that have been cleansed with Bactoshield.  No use of hair removal products or shaving at or near the surgical site 48 hours before your procedure. (72 hours for cardiac patients.)  For those having perineal surgeries (i.e.: vaginal, rectal or cystoscopy) - please use Dial soap.    6. If you develop a cold, cough, fever, rash, or other symptom prior to the day of the procedure, please report it to your physician immediately.    7. If you need to cancel the procedure for any reason, please contact your physician.    8. Make arrangements to have safe transportation home accompanied by a responsible adult. If you have not arranged safe transportation home, your surgery will be cancelled. Safe transportation may include private vehicle, ride-share service, taxi and public transportation when accompanied by a responsible adult who will assist you home. A responsible adult is  someone known to you and does not include the taxi, ride-share or public transit drive transporting you.    9.  If it is medically necessary for you to have a longer stay, you will be informed as soon as the decision is made.    10. Only bring essential items to the hospital.  Do not wear or bring anything of value to the hospital including jewelry of any kind, money, or wallet. Do not wear make-up or contact lenses.  DO NOT BRING MEDICATIONS FROM HOME unless instructed to do so. DO bring your hearing aids, glasses, and a case    11. No lotion, creams, powders, or oils on skin the morning of procedure     12. Dress in comfortable clothes.    13.  If instructed, please bring a copy of your Advanced Directive (Living Will/Durable Power of ) on the day of your procedure.     14. Ensuring your safety at all times is a very important part of our [facility] Culture of Safety. After having surgery and sedation, you are at risk for falling and balance issues. Although you may feel awake, the effects of the medication can last up to 24 hours after anesthesia. If you need to use the bathroom during your recovery period, nursing staff will escort you there and stay with you to ensure your safety.    15. Refrain from drinking alcohol and smoking cigarettes for 24 hours prior to surgery.    16. Shower with antibacterial soap (Dial) the night before and morning of your procedure.  If your procedure indicates the need for CHG antiseptic wash (Bactoshield or Hibiclens), please use this instead and follow instructions as discussed at the time of your Pre-Admission Testing phone interview or visit.    Above instructions reviewed with patient and patient acknowledges understanding.    Form explained by: Barbara Alfonso RN

## 2025-02-17 ENCOUNTER — ANESTHESIA EVENT (OUTPATIENT)
Dept: OPERATING ROOM | Facility: HOSPITAL | Age: 85
Setting detail: SURGERY ADMIT
DRG: 467 | End: 2025-02-17
Payer: COMMERCIAL

## 2025-02-17 ASSESSMENT — ENCOUNTER SYMPTOMS: DYSRHYTHMIAS: 1

## 2025-02-18 ENCOUNTER — APPOINTMENT (OUTPATIENT)
Dept: RADIOLOGY | Facility: HOSPITAL | Age: 85
Setting detail: SURGERY ADMIT
DRG: 467 | End: 2025-02-18
Attending: NURSE PRACTITIONER
Payer: COMMERCIAL

## 2025-02-18 ENCOUNTER — HOSPITAL ENCOUNTER (INPATIENT)
Facility: HOSPITAL | Age: 85
LOS: 1 days | Discharge: HOME | DRG: 467 | End: 2025-02-19
Attending: ORTHOPAEDIC SURGERY | Admitting: ORTHOPAEDIC SURGERY
Payer: COMMERCIAL

## 2025-02-18 ENCOUNTER — ANESTHESIA (OUTPATIENT)
Dept: OPERATING ROOM | Facility: HOSPITAL | Age: 85
Setting detail: SURGERY ADMIT
DRG: 467 | End: 2025-02-18
Payer: COMMERCIAL

## 2025-02-18 ENCOUNTER — DOCUMENTATION (OUTPATIENT)
Dept: PHYSICAL THERAPY | Age: 85
End: 2025-02-18
Payer: COMMERCIAL

## 2025-02-18 DIAGNOSIS — T84.84XA PAIN DUE TO INTERNAL ORTHOPEDIC PROSTHETIC DEVICES, IMPLANTS AND GRAFTS, INITIAL ENCOUNTER (CMS/HCC): ICD-10-CM

## 2025-02-18 DIAGNOSIS — Z96.651 S/P REVISION OF TOTAL KNEE, RIGHT: Primary | ICD-10-CM

## 2025-02-18 PROCEDURE — 63600000 HC DRUGS/DETAIL CODE: Mod: JZ | Performed by: ORTHOPAEDIC SURGERY

## 2025-02-18 PROCEDURE — 87070 CULTURE OTHR SPECIMN AEROBIC: CPT | Performed by: ORTHOPAEDIC SURGERY

## 2025-02-18 PROCEDURE — 73560 X-RAY EXAM OF KNEE 1 OR 2: CPT | Mod: RT

## 2025-02-18 PROCEDURE — 71000011 HC PACU PHASE 1 EA ADDL MIN: Performed by: ORTHOPAEDIC SURGERY

## 2025-02-18 PROCEDURE — 63700000 HC SELF-ADMINISTRABLE DRUG: Performed by: STUDENT IN AN ORGANIZED HEALTH CARE EDUCATION/TRAINING PROGRAM

## 2025-02-18 PROCEDURE — 0SRT0J9 REPLACEMENT OF RIGHT KNEE JOINT, FEMORAL SURFACE WITH SYNTHETIC SUBSTITUTE, CEMENTED, OPEN APPROACH: ICD-10-PCS | Performed by: ORTHOPAEDIC SURGERY

## 2025-02-18 PROCEDURE — 36000015 HC OR LEVEL 5 EA ADDL MIN: Performed by: ORTHOPAEDIC SURGERY

## 2025-02-18 PROCEDURE — L1832 KO ADJ JNT POS R SUP PRE CST: HCPCS | Performed by: ORTHOPAEDIC SURGERY

## 2025-02-18 PROCEDURE — 27800000 HC SUPPLY/IMPLANTS: Performed by: ORTHOPAEDIC SURGERY

## 2025-02-18 PROCEDURE — 12000000 HC ROOM AND CARE MED/SURG

## 2025-02-18 PROCEDURE — 0SPT0JZ REMOVAL OF SYNTHETIC SUBSTITUTE FROM RIGHT KNEE JOINT, FEMORAL SURFACE, OPEN APPROACH: ICD-10-PCS | Performed by: ORTHOPAEDIC SURGERY

## 2025-02-18 PROCEDURE — 63700000 HC SELF-ADMINISTRABLE DRUG: Performed by: NURSE PRACTITIONER

## 2025-02-18 PROCEDURE — C1713 ANCHOR/SCREW BN/BN,TIS/BN: HCPCS | Performed by: ORTHOPAEDIC SURGERY

## 2025-02-18 PROCEDURE — 25000000 HC PHARMACY GENERAL: Performed by: ANESTHESIOLOGY

## 2025-02-18 PROCEDURE — 37000001 HC ANESTHESIA GENERAL: Performed by: ORTHOPAEDIC SURGERY

## 2025-02-18 PROCEDURE — 87205 SMEAR GRAM STAIN: CPT | Performed by: ORTHOPAEDIC SURGERY

## 2025-02-18 PROCEDURE — 63600000 HC DRUGS/DETAIL CODE: Mod: JZ,TB | Performed by: ANESTHESIOLOGY

## 2025-02-18 PROCEDURE — 63600000 HC DRUGS/DETAIL CODE: Mod: JZ | Performed by: NURSE PRACTITIONER

## 2025-02-18 PROCEDURE — 25800000 HC PHARMACY IV SOLUTIONS: Performed by: ORTHOPAEDIC SURGERY

## 2025-02-18 PROCEDURE — 63600000 HC DRUGS/DETAIL CODE: Mod: JZ | Performed by: ANESTHESIOLOGY

## 2025-02-18 PROCEDURE — 25800000 HC PHARMACY IV SOLUTIONS: Performed by: NURSE PRACTITIONER

## 2025-02-18 PROCEDURE — 87102 FUNGUS ISOLATION CULTURE: CPT | Performed by: ORTHOPAEDIC SURGERY

## 2025-02-18 PROCEDURE — C1776 JOINT DEVICE (IMPLANTABLE): HCPCS | Performed by: ORTHOPAEDIC SURGERY

## 2025-02-18 PROCEDURE — 27200000 HC STERILE SUPPLY: Performed by: ORTHOPAEDIC SURGERY

## 2025-02-18 PROCEDURE — 25000000 HC PHARMACY GENERAL: Performed by: ORTHOPAEDIC SURGERY

## 2025-02-18 PROCEDURE — 63700000 HC SELF-ADMINISTRABLE DRUG

## 2025-02-18 PROCEDURE — 87206 SMEAR FLUORESCENT/ACID STAI: CPT | Performed by: ORTHOPAEDIC SURGERY

## 2025-02-18 PROCEDURE — 25800000 HC PHARMACY IV SOLUTIONS

## 2025-02-18 PROCEDURE — 63600000 HC DRUGS/DETAIL CODE: Mod: JZ

## 2025-02-18 PROCEDURE — 71000001 HC PACU PHASE 1 INITIAL 30MIN: Performed by: ORTHOPAEDIC SURGERY

## 2025-02-18 PROCEDURE — 36000005 HC OR LEVEL 5 INITIAL 30MIN: Performed by: ORTHOPAEDIC SURGERY

## 2025-02-18 PROCEDURE — 25800000 HC PHARMACY IV SOLUTIONS: Performed by: ANESTHESIOLOGY

## 2025-02-18 DEVICE — CORKSCREW FT BIOCOMPOSITE 5.5MM X 15MM: Type: IMPLANTABLE DEVICE | Site: KNEE | Status: FUNCTIONAL

## 2025-02-18 DEVICE — P.F.C. SIGMA FEMORAL ADAPTER 5 DEGREE
Type: IMPLANTABLE DEVICE | Site: KNEE | Status: FUNCTIONAL
Brand: P.F.C. SIGMA

## 2025-02-18 DEVICE — P.F.C. SIGMA POSTERIOR AUGMENT COMBO CEMENTED SIZE 4 8MM
Type: IMPLANTABLE DEVICE | Site: KNEE | Status: FUNCTIONAL
Brand: P.F.C. SIGMA

## 2025-02-18 DEVICE — IMPLANTABLE DEVICE: Type: IMPLANTABLE DEVICE | Site: KNEE | Status: FUNCTIONAL

## 2025-02-18 DEVICE — CEMENT BONE SIMPLEX FULL DOSE: Type: IMPLANTABLE DEVICE | Site: KNEE | Status: FUNCTIONAL

## 2025-02-18 DEVICE — P.F.C. SIGMA FEMORAL ADAPTER BOLT +2/-2
Type: IMPLANTABLE DEVICE | Site: KNEE | Status: FUNCTIONAL
Brand: P.F.C. SIGMA

## 2025-02-18 DEVICE — P.F.C. SIGMA DISTAL AUGMENT COMBO SIZE 4 12MM RIGHT
Type: IMPLANTABLE DEVICE | Site: KNEE | Status: FUNCTIONAL
Brand: P.F.C. SIGMA

## 2025-02-18 DEVICE — P.F.C. SIGMA DISTAL AUGMENT COMBO SIZE 4 16MM RIGHT
Type: IMPLANTABLE DEVICE | Site: KNEE | Status: FUNCTIONAL
Brand: P.F.C. SIGMA

## 2025-02-18 DEVICE — SIGMA TAPER STEM 13MM X 90MM CEMENTED
Type: IMPLANTABLE DEVICE | Site: KNEE | Status: FUNCTIONAL
Brand: SIGMA

## 2025-02-18 DEVICE — SIGMA FEMORAL TC3 CEMENTED 4 RIGHT
Type: IMPLANTABLE DEVICE | Site: KNEE | Status: FUNCTIONAL
Brand: SIGMA

## 2025-02-18 RX ORDER — DIPHENHYDRAMINE HCL 25 MG
25 CAPSULE ORAL EVERY 6 HOURS PRN
Status: DISCONTINUED | OUTPATIENT
Start: 2025-02-18 | End: 2025-02-19 | Stop reason: HOSPADM

## 2025-02-18 RX ORDER — ROSUVASTATIN CALCIUM 40 MG/1
40 TABLET, COATED ORAL
Status: DISCONTINUED | OUTPATIENT
Start: 2025-02-19 | End: 2025-02-19 | Stop reason: HOSPADM

## 2025-02-18 RX ORDER — TRAMADOL HYDROCHLORIDE 50 MG/1
50-100 TABLET ORAL EVERY 6 HOURS PRN
Status: DISCONTINUED | OUTPATIENT
Start: 2025-02-18 | End: 2025-02-19 | Stop reason: HOSPADM

## 2025-02-18 RX ORDER — ACETAMINOPHEN 325 MG/1
975 TABLET ORAL
Status: COMPLETED | OUTPATIENT
Start: 2025-02-18 | End: 2025-02-18

## 2025-02-18 RX ORDER — CELECOXIB 200 MG/1
CAPSULE ORAL
Status: COMPLETED
Start: 2025-02-18 | End: 2025-02-18

## 2025-02-18 RX ORDER — SODIUM CHLORIDE 9 MG/ML
INJECTION, SOLUTION INTRAVENOUS CONTINUOUS PRN
Status: DISCONTINUED | OUTPATIENT
Start: 2025-02-18 | End: 2025-02-18 | Stop reason: SURG

## 2025-02-18 RX ORDER — ESMOLOL HYDROCHLORIDE 10 MG/ML
INJECTION INTRAVENOUS AS NEEDED
Status: DISCONTINUED | OUTPATIENT
Start: 2025-02-18 | End: 2025-02-18 | Stop reason: SURG

## 2025-02-18 RX ORDER — EPHEDRINE SULFATE 50 MG/ML
INJECTION, SOLUTION INTRAVENOUS AS NEEDED
Status: DISCONTINUED | OUTPATIENT
Start: 2025-02-18 | End: 2025-02-18 | Stop reason: SURG

## 2025-02-18 RX ORDER — AMOXICILLIN 250 MG
1 CAPSULE ORAL 2 TIMES DAILY
Status: DISCONTINUED | OUTPATIENT
Start: 2025-02-18 | End: 2025-02-19 | Stop reason: HOSPADM

## 2025-02-18 RX ORDER — IBUPROFEN 200 MG
16-32 TABLET ORAL AS NEEDED
Status: DISCONTINUED | OUTPATIENT
Start: 2025-02-18 | End: 2025-02-19 | Stop reason: HOSPADM

## 2025-02-18 RX ORDER — ONDANSETRON HYDROCHLORIDE 2 MG/ML
INJECTION, SOLUTION INTRAVENOUS AS NEEDED
Status: DISCONTINUED | OUTPATIENT
Start: 2025-02-18 | End: 2025-02-18 | Stop reason: SURG

## 2025-02-18 RX ORDER — PHENYLEPHRINE HCL IN 0.9% NACL 1 MG/10 ML
SYRINGE (ML) INTRAVENOUS AS NEEDED
Status: DISCONTINUED | OUTPATIENT
Start: 2025-02-18 | End: 2025-02-18 | Stop reason: SURG

## 2025-02-18 RX ORDER — ROCURONIUM BROMIDE 10 MG/ML
INJECTION, SOLUTION INTRAVENOUS AS NEEDED
Status: DISCONTINUED | OUTPATIENT
Start: 2025-02-18 | End: 2025-02-18 | Stop reason: SURG

## 2025-02-18 RX ORDER — ACETAMINOPHEN 325 MG/1
TABLET ORAL
Status: COMPLETED
Start: 2025-02-18 | End: 2025-02-18

## 2025-02-18 RX ORDER — OXYCODONE HYDROCHLORIDE 5 MG/1
5 TABLET ORAL EVERY 4 HOURS PRN
Status: DISCONTINUED | OUTPATIENT
Start: 2025-02-18 | End: 2025-02-18

## 2025-02-18 RX ORDER — PROPOFOL 10 MG/ML
INJECTION, EMULSION INTRAVENOUS AS NEEDED
Status: DISCONTINUED | OUTPATIENT
Start: 2025-02-18 | End: 2025-02-18 | Stop reason: SURG

## 2025-02-18 RX ORDER — HYDROMORPHONE HYDROCHLORIDE 1 MG/ML
INJECTION, SOLUTION INTRAMUSCULAR; INTRAVENOUS; SUBCUTANEOUS AS NEEDED
Status: DISCONTINUED | OUTPATIENT
Start: 2025-02-18 | End: 2025-02-18 | Stop reason: SURG

## 2025-02-18 RX ORDER — ONDANSETRON HYDROCHLORIDE 2 MG/ML
4 INJECTION, SOLUTION INTRAVENOUS
Status: DISCONTINUED | OUTPATIENT
Start: 2025-02-18 | End: 2025-02-18 | Stop reason: HOSPADM

## 2025-02-18 RX ORDER — SODIUM CHLORIDE 9 MG/ML
INJECTION, SOLUTION INTRAVENOUS CONTINUOUS
Status: DISCONTINUED | OUTPATIENT
Start: 2025-02-18 | End: 2025-02-19 | Stop reason: HOSPADM

## 2025-02-18 RX ORDER — IBUPROFEN 200 MG
16-32 TABLET ORAL AS NEEDED
Status: DISCONTINUED | OUTPATIENT
Start: 2025-02-18 | End: 2025-02-18 | Stop reason: HOSPADM

## 2025-02-18 RX ORDER — MONTELUKAST SODIUM 10 MG/1
10 TABLET ORAL NIGHTLY
Status: DISCONTINUED | OUTPATIENT
Start: 2025-02-18 | End: 2025-02-19 | Stop reason: HOSPADM

## 2025-02-18 RX ORDER — OXYCODONE HYDROCHLORIDE 5 MG/1
10 TABLET ORAL EVERY 4 HOURS PRN
Status: DISCONTINUED | OUTPATIENT
Start: 2025-02-18 | End: 2025-02-18

## 2025-02-18 RX ORDER — DEXTROSE 50 % IN WATER (D50W) INTRAVENOUS SYRINGE
25 AS NEEDED
Status: DISCONTINUED | OUTPATIENT
Start: 2025-02-18 | End: 2025-02-18 | Stop reason: HOSPADM

## 2025-02-18 RX ORDER — FENTANYL CITRATE 50 UG/ML
50 INJECTION, SOLUTION INTRAMUSCULAR; INTRAVENOUS EVERY 5 MIN PRN
Status: COMPLETED | OUTPATIENT
Start: 2025-02-18 | End: 2025-02-18

## 2025-02-18 RX ORDER — ONDANSETRON HYDROCHLORIDE 2 MG/ML
4 INJECTION, SOLUTION INTRAVENOUS EVERY 8 HOURS PRN
Status: DISCONTINUED | OUTPATIENT
Start: 2025-02-18 | End: 2025-02-19 | Stop reason: HOSPADM

## 2025-02-18 RX ORDER — DEXAMETHASONE SODIUM PHOSPHATE 4 MG/ML
8 INJECTION, SOLUTION INTRA-ARTICULAR; INTRALESIONAL; INTRAMUSCULAR; INTRAVENOUS; SOFT TISSUE ONCE
Status: COMPLETED | OUTPATIENT
Start: 2025-02-19 | End: 2025-02-19

## 2025-02-18 RX ORDER — DEXTROSE 40 %
15-30 GEL (GRAM) ORAL AS NEEDED
Status: DISCONTINUED | OUTPATIENT
Start: 2025-02-18 | End: 2025-02-18 | Stop reason: HOSPADM

## 2025-02-18 RX ORDER — POLYETHYLENE GLYCOL 3350 17 G/17G
17 POWDER, FOR SOLUTION ORAL DAILY
Status: DISCONTINUED | OUTPATIENT
Start: 2025-02-19 | End: 2025-02-19 | Stop reason: HOSPADM

## 2025-02-18 RX ORDER — DIPHENHYDRAMINE HCL 50 MG/ML
25 VIAL (ML) INJECTION EVERY 6 HOURS PRN
Status: DISCONTINUED | OUTPATIENT
Start: 2025-02-18 | End: 2025-02-19 | Stop reason: HOSPADM

## 2025-02-18 RX ORDER — DEXTROSE 50 % IN WATER (D50W) INTRAVENOUS SYRINGE
25 AS NEEDED
Status: DISCONTINUED | OUTPATIENT
Start: 2025-02-18 | End: 2025-02-19 | Stop reason: HOSPADM

## 2025-02-18 RX ORDER — BISACODYL 10 MG/1
10 SUPPOSITORY RECTAL DAILY PRN
Status: DISCONTINUED | OUTPATIENT
Start: 2025-02-18 | End: 2025-02-19 | Stop reason: HOSPADM

## 2025-02-18 RX ORDER — KETOROLAC TROMETHAMINE 15 MG/ML
15 INJECTION, SOLUTION INTRAMUSCULAR; INTRAVENOUS
Status: COMPLETED | OUTPATIENT
Start: 2025-02-18 | End: 2025-02-19

## 2025-02-18 RX ORDER — LIDOCAINE HYDROCHLORIDE 10 MG/ML
INJECTION, SOLUTION INFILTRATION; PERINEURAL AS NEEDED
Status: DISCONTINUED | OUTPATIENT
Start: 2025-02-18 | End: 2025-02-18 | Stop reason: SURG

## 2025-02-18 RX ORDER — PHENYLEPHRINE HCL IN 0.9% NACL 50MG/250ML
PLASTIC BAG, INJECTION (ML) INTRAVENOUS CONTINUOUS PRN
Status: DISCONTINUED | OUTPATIENT
Start: 2025-02-18 | End: 2025-02-18 | Stop reason: SURG

## 2025-02-18 RX ORDER — ALUMINUM HYDROXIDE, MAGNESIUM HYDROXIDE, AND SIMETHICONE 1200; 120; 1200 MG/30ML; MG/30ML; MG/30ML
30 SUSPENSION ORAL EVERY 4 HOURS PRN
Status: DISCONTINUED | OUTPATIENT
Start: 2025-02-18 | End: 2025-02-19 | Stop reason: HOSPADM

## 2025-02-18 RX ORDER — HYDROMORPHONE HYDROCHLORIDE 1 MG/ML
0.5 INJECTION, SOLUTION INTRAMUSCULAR; INTRAVENOUS; SUBCUTANEOUS
Status: DISCONTINUED | OUTPATIENT
Start: 2025-02-18 | End: 2025-02-18 | Stop reason: HOSPADM

## 2025-02-18 RX ORDER — CELECOXIB 200 MG/1
400 CAPSULE ORAL
Status: COMPLETED | OUTPATIENT
Start: 2025-02-18 | End: 2025-02-18

## 2025-02-18 RX ORDER — EZETIMIBE 10 MG/1
10 TABLET ORAL DAILY
Status: DISCONTINUED | OUTPATIENT
Start: 2025-02-19 | End: 2025-02-19 | Stop reason: HOSPADM

## 2025-02-18 RX ORDER — FENTANYL CITRATE 50 UG/ML
INJECTION, SOLUTION INTRAMUSCULAR; INTRAVENOUS AS NEEDED
Status: DISCONTINUED | OUTPATIENT
Start: 2025-02-18 | End: 2025-02-18 | Stop reason: SURG

## 2025-02-18 RX ORDER — TAMSULOSIN HYDROCHLORIDE 0.4 MG/1
0.4 CAPSULE ORAL NIGHTLY
Status: DISCONTINUED | OUTPATIENT
Start: 2025-02-18 | End: 2025-02-19 | Stop reason: HOSPADM

## 2025-02-18 RX ORDER — ONDANSETRON 4 MG/1
4 TABLET, ORALLY DISINTEGRATING ORAL EVERY 8 HOURS PRN
Status: DISCONTINUED | OUTPATIENT
Start: 2025-02-18 | End: 2025-02-19 | Stop reason: HOSPADM

## 2025-02-18 RX ORDER — DEXTROSE 40 %
15-30 GEL (GRAM) ORAL AS NEEDED
Status: DISCONTINUED | OUTPATIENT
Start: 2025-02-18 | End: 2025-02-19 | Stop reason: HOSPADM

## 2025-02-18 RX ORDER — VANCOMYCIN HYDROCHLORIDE 500 MG/10ML
INJECTION, POWDER, LYOPHILIZED, FOR SOLUTION INTRAVENOUS
Status: DISCONTINUED | OUTPATIENT
Start: 2025-02-18 | End: 2025-02-18 | Stop reason: HOSPADM

## 2025-02-18 RX ORDER — ACETAMINOPHEN 325 MG/1
650 TABLET ORAL
Status: DISCONTINUED | OUTPATIENT
Start: 2025-02-18 | End: 2025-02-19 | Stop reason: HOSPADM

## 2025-02-18 RX ADMIN — EPHEDRINE SULFATE 10 MG: 50 INJECTION, SOLUTION INTRAVENOUS at 16:17

## 2025-02-18 RX ADMIN — ACETAMINOPHEN 650 MG: 325 TABLET ORAL at 19:36

## 2025-02-18 RX ADMIN — ESMOLOL HYDROCHLORIDE 20 MG: 10 INJECTION, SOLUTION INTRAVENOUS at 14:27

## 2025-02-18 RX ADMIN — TRAMADOL HYDROCHLORIDE 50 MG: 50 TABLET, COATED ORAL at 20:56

## 2025-02-18 RX ADMIN — FENTANYL CITRATE 50 MCG: 50 INJECTION INTRAMUSCULAR; INTRAVENOUS at 16:44

## 2025-02-18 RX ADMIN — FENTANYL CITRATE 50 MCG: 50 INJECTION, SOLUTION INTRAMUSCULAR; INTRAVENOUS at 18:19

## 2025-02-18 RX ADMIN — ACETAMINOPHEN 975 MG: 325 TABLET ORAL at 10:16

## 2025-02-18 RX ADMIN — CELECOXIB 400 MG: 200 CAPSULE ORAL at 10:16

## 2025-02-18 RX ADMIN — Medication 50 MCG: at 14:12

## 2025-02-18 RX ADMIN — ROCURONIUM BROMIDE 70 MG: 10 INJECTION, SOLUTION INTRAVENOUS at 13:47

## 2025-02-18 RX ADMIN — FENTANYL CITRATE 50 MCG: 50 INJECTION, SOLUTION INTRAMUSCULAR; INTRAVENOUS at 18:14

## 2025-02-18 RX ADMIN — TRANEXAMIC ACID 1500 MG: 100 INJECTION, SOLUTION INTRAVENOUS at 13:58

## 2025-02-18 RX ADMIN — SODIUM CHLORIDE: 9 INJECTION, SOLUTION INTRAVENOUS at 20:57

## 2025-02-18 RX ADMIN — FENTANYL CITRATE 50 MCG: 50 INJECTION INTRAMUSCULAR; INTRAVENOUS at 14:41

## 2025-02-18 RX ADMIN — SUGAMMADEX 200 MG: 100 INJECTION, SOLUTION INTRAVENOUS at 17:31

## 2025-02-18 RX ADMIN — CEFAZOLIN 2 G: 2 INJECTION, POWDER, FOR SOLUTION INTRAMUSCULAR; INTRAVENOUS at 13:58

## 2025-02-18 RX ADMIN — HYDROMORPHONE HYDROCHLORIDE 0.5 MG: 1 INJECTION, SOLUTION INTRAMUSCULAR; INTRAVENOUS; SUBCUTANEOUS at 14:16

## 2025-02-18 RX ADMIN — MONTELUKAST 10 MG: 10 TABLET, FILM COATED ORAL at 20:56

## 2025-02-18 RX ADMIN — KETOROLAC TROMETHAMINE 15 MG: 15 INJECTION, SOLUTION INTRAMUSCULAR; INTRAVENOUS at 19:36

## 2025-02-18 RX ADMIN — PROPOFOL 80 MG: 10 INJECTION, EMULSION INTRAVENOUS at 13:46

## 2025-02-18 RX ADMIN — SODIUM CHLORIDE: 0.9 INJECTION, SOLUTION INTRAVENOUS at 13:36

## 2025-02-18 RX ADMIN — Medication 25 MCG/MIN: at 13:57

## 2025-02-18 RX ADMIN — TRAMADOL HYDROCHLORIDE 50 MG: 50 TABLET, COATED ORAL at 19:52

## 2025-02-18 RX ADMIN — FENTANYL CITRATE 100 MCG: 50 INJECTION INTRAMUSCULAR; INTRAVENOUS at 13:45

## 2025-02-18 RX ADMIN — TAMSULOSIN HYDROCHLORIDE 0.4 MG: 0.4 CAPSULE ORAL at 20:56

## 2025-02-18 RX ADMIN — ESMOLOL HYDROCHLORIDE 10 MG: 10 INJECTION, SOLUTION INTRAVENOUS at 13:49

## 2025-02-18 RX ADMIN — Medication 50 MCG: at 13:57

## 2025-02-18 RX ADMIN — LIDOCAINE HYDROCHLORIDE 7 ML: 10 INJECTION, SOLUTION INFILTRATION; PERINEURAL at 13:45

## 2025-02-18 RX ADMIN — Medication 50 MCG: at 13:45

## 2025-02-18 RX ADMIN — CEFAZOLIN 1 G: 2 INJECTION, POWDER, FOR SOLUTION INTRAMUSCULAR; INTRAVENOUS at 17:04

## 2025-02-18 RX ADMIN — SODIUM CHLORIDE 1000 MG: 900 INJECTION INTRAVENOUS at 10:32

## 2025-02-18 RX ADMIN — ONDANSETRON 4 MG: 2 INJECTION INTRAMUSCULAR; INTRAVENOUS at 17:31

## 2025-02-18 RX ADMIN — HYDROMORPHONE HYDROCHLORIDE 0.5 MG: 1 INJECTION, SOLUTION INTRAMUSCULAR; INTRAVENOUS; SUBCUTANEOUS at 18:45

## 2025-02-18 ASSESSMENT — COGNITIVE AND FUNCTIONAL STATUS - GENERAL
MOVING TO AND FROM BED TO CHAIR: 2 - A LOT
WALKING IN HOSPITAL ROOM: 2 - A LOT
STANDING UP FROM CHAIR USING ARMS: 2 - A LOT
CLIMB 3 TO 5 STEPS WITH RAILING: 2 - A LOT

## 2025-02-18 ASSESSMENT — PAIN SCALES - GENERAL: PAIN_LEVEL: 4

## 2025-02-18 NOTE — ASSESSMENT & PLAN NOTE
Persistent, on Xarelto as an outpatient  Not on any rate controlling medication  Was transitioned to Lovenox 3day pre-op  - Resume Xarelto post-op when cleared by Ortho  - K>4, Mg >2  - Monitor on tele for 24h post-op

## 2025-02-18 NOTE — ANESTHESIA PROCEDURE NOTES
Airway  Urgency: elective    Start Time: 2/18/2025 1:48 PM  Airway not difficult    General Information and Staff    Patient location during procedure: OR  Anesthesiologist: Yadira Navarrete MD  Performed: anesthesiologist   Performed by: Yadira Navarrete MD  Authorized by: Yadira Navarrete MD      Indications and Patient Condition  Indications for airway management: anesthesia  Sedation level: general  Preoxygenated: yes  Patient position: sniffing  Mask difficulty assessment: 1 - vent by mask    Final Airway Details  Final airway type: endotracheal airway      Successful airway: ETT  Cuffed: yes   Successful intubation technique: video laryngoscopy  Facilitating devices/methods: intubating stylet  Endotracheal tube insertion site: oral  Blade type: glide.  Blade size: #4  ETT size (mm): 7.5  Cormack-Lehane Classification: grade I - full view of glottis  Placement verified by: chest auscultation and capnometry   Measured from: teeth  ETT to teeth (cm): 24  Number of attempts at approach: 1  Number of other approaches attempted: 0  Atraumatic airway insertion

## 2025-02-18 NOTE — ASSESSMENT & PLAN NOTE
s/p Revision RTKA on 2/18/25  - Management as per Ortho  - Pain control, DVT ppx, WBS, PT/OT as per Ortho  - Encourage IS  - Recommend bowel regimen

## 2025-02-18 NOTE — ANESTHESIA PREPROCEDURE EVALUATION
Relevant Problems   CARDIOVASCULAR   (+) Aortic stenosis   (+) Atrial fibrillation (CMS/HCC)   (+) CAD (coronary artery disease)      HEMATOLOGY   (+) Anemia      MUSCULOSKELETAL   (+) Cervical spondylosis   (+) Lumbar spondylosis   (+) Osteoarthritis of hip   (+) Osteoarthritis, hand      RESPIRATORY SYSTEM   (+) Asthma   (+) Shortness of breath      URINARY SYSTEM   (+) BPH (benign prostatic hyperplasia)     85 y/o M h/o TIA in 2023 (when xarelto was held for a procedure- he will be bridged with lovenox), persistent A fib on xarelto, mod AS, CAD s/p 2 stents, carotid stenosis, HTN, HLD, mild intermittent asthma, BPH, prostate CA, OA, here for R total knee revision.    On xarelto, mod AS, plt 279  Per cards patient to transition to lovenox BID for 3 days prior to surgery, then off for 12 hours prior to surgery   Cards recommend patient to continue ASA    Anesthesia ROS/MED HX    Anesthesia History    Previous anesthetics  No family history of anesthetic complications  No history of anesthetic complications  Pulmonary    asthma  Neuro/Psych    TIA  Cardiovascular   Valvular problems/murmurs   Aortic Stenosis: moderate   CAD   Cardiac stents   dyslipidemia  Dysrhythmias and atrial fibrillation  Hematological    anemia  GI/Hepatic- neg  Musculoskeletal   Osteoarthritis  Renal Disease   BPH  Endo/Other  History of cancer and prostate cancer  Body Habitus: Overweight  Pediatric Considerations    Murmur       Current Outpatient Medications:   •  aspirin 81 mg enteric coated tablet, Take 81 mg by mouth daily., Disp: , Rfl:   •  calcium carbonate/vitamin D3 (CALCIUM 500 + D, D3, ORAL), Take by mouth daily. Calcium 500mg D3 25mcg, Disp: , Rfl:   •  cyanocobalamin (VITAMIN B12) 1,000 mcg tablet, Take 1,000 mcg by mouth daily., Disp: , Rfl:   •  ezetimibe (ZETIA) 10 mg tablet, Take 10 mg by mouth daily., Disp: , Rfl:   •  montelukast (SINGULAIR) 10 mg tablet, Take 1 tablet (10 mg total) by mouth nightly., Disp: 90 tablet,  "Rfl: 3  •  multivitamin tablet, Take by mouth daily., Disp: , Rfl:   •  omega-3 acid ethyl esters (LOVAZA) 1 gram capsule, Take 1,000 mg by mouth daily., Disp: , Rfl:   •  rosuvastatin (CRESTOR) 40 mg tablet, Take 1 tablet (40 mg total) by mouth once daily., Disp: 90 tablet, Rfl: 3  •  tamsulosin (FLOMAX) 0.4 mg capsule, Take 0.4 mg by mouth., Disp: , Rfl:   •  XARELTO 20 mg tablet, TAKE 1 TABLET BY MOUTH EVERY DAY WITH DINNER, Disp: , Rfl: 2     Past Surgical History   Procedure Laterality Date   • Basal cell carcinoma excision  08/04/2000   • Cardiac catheterization N/A 10/22/2004   • Cardiac catheterization  08/05/2021   • Cardiac surgery  05/18/2021    \"heart procedure\"   • Coronary stent placement  10/22/2004    x2   • Eye surgery Right 09/21/2017    laser   • Mohs surgery     • Replacement total knee Left 06/01/1994   • Replacement total knee Right 03/09/1994   • Revision total knee arthroplasty Right 2015    2nd REVISION 2017   • Shoulder surgery Right 01/06/2005   • Shoulder surgery Left 01/12/1989   • Total hip arthroplasty Right 2017       Physical Exam    Airway   Mallampati: II   TM distance: >3 FB   Neck ROM: full  Cardiovascular    Rhythm: regular   Rate: normal   MurmurPulmonary - normal   clear to auscultation  Dental         2/11/2025 ECG  2/11/2025 at his cardiologist's office  A flutter with LVH and anterior septal infarct    IMAGING:    Cardiac Imaging    ECHOCARDIOGRAM STRESS TEST     TRANSTHORACIC ECHO (TTE) COMPLETE  Order: [id number]  Narrative    •  A complete transthoracic echocardiogram (including 2D, color flow  Doppler, spectral Doppler and M-mode imaging) was performed using the  standard protocol. The study quality was adequate.  •  The left ventricle is normal in size. There is severely increased wall  thickness consistent with severe concentric hypertrophy. There are no  segmental wall motion abnormalities. Normal left ventricular ejection  fraction. The left ventricular ejection " fraction is 60%.  •  The right ventricle is normal in size. There is normal function of the  right ventricle.  •  The left atrium is mildly dilated.  •  The right atrium is normal in size.  •  There is moderate mitral valve leaflet calcification. There is mild  mitral regurgitation. There is no mitral stenosis.  •  The aortic valve is trileaflet. There is mild aortic valve thickening.  There is mild aortic valve calcification. There is mild to moderate aortic  stenosis. There is no aortic regurgitation.  •  The tricuspid valve is normal in structure. There is mild tricuspid  regurgitation.  •  The inferior vena cava is normal in size (diameter <21 mm) and  decreases >50% in size with inspiration, suggesting a normal right atrial  pressure of 3 mmHg (range 0-5 mm Hg).  •  Compared to the prior study of 5/18/2021, there are no significant  changes.    Left Ventricle  The left ventricle is normal in size. There is severely increased left ventricular wall thickness consistent with severe concentric hypertrophy. There are no segmental wall motion abnormalities. The left ventricular ejection fraction is 60%. Normal left ventricular ejection fraction.    Right Ventricle  The right ventricle is normal in size. There is normal function of the right ventricle.    Left Atrium  The left atrium is mildly dilated.    Right Atrium  The right atrium is normal in size.    IVC/SVC  The inferior vena cava is normal in size (diameter <21 mm) and decreases >50% in size with inspiration, suggesting a normal right atrial pressure of 3 mmHg (range 0-5 mm Hg).    Mitral Valve  There is moderate mitral valve leaflet calcification. There is no mitral stenosis. There is mild mitral regurgitation.    Tricuspid Valve  The tricuspid valve is normal in structure. There is no tricuspid stenosis. There is mild tricuspid regurgitation.    Aortic Valve  The aortic valve is trileaflet. There is mild aortic valve thickening. There is mild aortic valve  calcification. PG 32mmHG/MG 18mmHG/ DVI 0.4 There is mild to moderate aortic stenosis. There is no aortic regurgitation.    Pulmonic Valve  Pulmonic valve is not well visualized, but probably normal. There is physiologic pulmonic valve regurgitation.    Pericardium  No pericardial effusion.    Septum  The interatrial septum is normal.    Aortic Arch/Descending/Abdominal Aorta  The aortic root is normal in size. The proximal segment of the ascending aorta is normal in size.    Study Details  A complete transthoracic echocardiogram (including 2D, color flow Doppler, spectral Doppler and M-mode imaging) was performed using the standard protocol. During the study, the following view(s) were acquired: apical, parasternal, subcostal and suprasternal. The study quality was adequate.    Nuclear Prior Study  Compared to the prior study of 5/18/2021, there are no significant changes.      LABS:    CBC Results         02/13/25 06/25/20 01/21/20     1437 0809 0816    WBC 6.80 6.4 7.2    RBC 3.94 4.34 4.23    HGB 11.5 13.4 12.8    HCT 36.5 41.5 39.9    MCV 92.6 95.6 94.3    MCH 29.2 30.9 30.3    MCHC 31.5 32.3 32.1     262 258          BMP Results         02/13/25 06/25/20 01/21/20     1437 0809 0816     139 --    K 4.3 4.6 --    Cl 102 103 --    CO2 28 31 --    Glucose 86 99 NEGATIVE      NEGATIVE     BUN 20 21 --    Creatinine 0.8 0.87 --    Calcium 9.4 8.9 --    Anion Gap 8 -- --    EGFR >60.0 82 --      94            Comment for Glucose at 0809 on 06/25/20:               Fasting reference interval         Comment for Creatinine at 0809 on 06/25/20: For patients >49 years of age, the reference limit  for Creatinine is approximately 13% higher for people  identified as -American.         Comment for EGFR at 1437 on 02/13/25: Calculation based on the Chronic Kidney Disease Epidemiology Collaboration (CKD-EPI) equation refit without adjustment for race.          PT/PTT Results    No lab values to display.        Recent Labs   Lab 02/13/25  1437   ALKPHOS 80   BILITOT 1.0   ALBUMIN 4.1   ALT 18   AST 26         Lab Results   Component Value Date    LABANTI Negative 02/13/2025    ABO O 02/13/2025    LABRH Positive 02/13/2025    HISTCK Previous type on file 02/13/2025               Anesthesia Plan    Plan: general    Technique: general endotracheal     Lines and Monitors: PIV and additional IV     Airway: oral intubation    3 ASA  Blood Products:   Use of Blood Products Discussed: No     Consented to blood products  Anesthetic plan and risks discussed with: patient  Induction:    intravenous   Postop Plan:   Patient Disposition: inpatient floor planned admission   Pain Management: IV analgesics  Comments:    Plan: Patient with AS, lovenox bridge from xarelto, and revision.  No spinal.

## 2025-02-18 NOTE — ANESTHESIOLOGIST PRE-PROCEDURE ATTESTATION
Pre-Procedure Patient Identification:  I am the Primary Anesthesiologist and have identified the patient on 02/18/25 at 11:58 AM.   I have confirmed the procedure(s) will be performed by the following surgeon/proceduralist Jurgen Dow MD.

## 2025-02-18 NOTE — OR SURGEON
Pre-Procedure patient identification:  I am the primary operating surgeon/proceduralist and I have reviewed the applicable pathology reports and radiology studies for this procedure. I have identified the patient and confirmed laterality is right on 02/18/25 at 10:47 AM Jurgen Dow MD  Phone Number: [phone]

## 2025-02-18 NOTE — PROGRESS NOTES
Patient: Bertrand Maher Jr.  Location: [facility] Operating Room [facility] OR [facility] ROOM  MRN:  [id number]  Today's date:  2/18/2025    Attempted to see patient for therapy. Unable due to medical hold (Spoke with RN in PACU who states pt not appropriate to be seen for PT eval. Will cont to follow and see pt as able.).

## 2025-02-18 NOTE — OP NOTE
Preoperative diagnosis: Painful right total knee arthroplasty     POSTOPERATIVE DIAGNOSIS:  Failed, painful right total knee arthroplasty     PROCEDURE: #1: Revision right total knee arthroplasty consisting of femoral component only.      SURGEON:  Jurgen Dow M.D.     ASSIST: Bonnie     ANESTHESIA:  General endotracheal     ESTIMATED BLOOD LOSS: 100 cc     IV FLUID:  Per anesthesia record.     URINE OUTPUT:  None.     COMPLICATIONS: During the exposure to remove the femoral component the tibial tubercle did avulse partially.  This was repaired with 3 suture anchors.    DRAINS:  None.     SPECIMENS:  3 tissue specimen sent for Gram stain and culture     TOTAL TOURNIQUET TIME: No tourniquet was utilized     COMPONENTS USED:   This was a revision DePuy system with a size 4 right TC 3 femoral component with a +2 bolt in the posterior highest position, 5 degree adapter, a 13 x 90 mm cemented femoral stem, bilateral 8 mm posterior augments, a 12 mm medial distal augment, and a 16 mm lateral distal augment.    We used a size medium concentric cone and a size 4 x 17.5 mm thickness TC 3 rotating platform polyethylene component.      INDICATIONS: The patient is status post a revision total knee arthroplasty with evidence of femoral loosening and clinical symptoms consistent with that.  The patient understood that risks of surgery included, but were not limited to cardiovascular and cardiopulmonary compromise, venous thromboembolism, infection, bleeding, neurovascular injury, persistent pain, disability, stiffness, loosening, fracture, and need for future surgical procedures.  Despite these risks, I felt that the patient was a candidate for surgical treatment due to the history physical exam and radiographic findings.  The patient understood and agreed, and elected to undergo the operation.     OPERATIVE FINDINGS: The femoral component was loose and came out without any bone loss fairly easily.  The tibial component was  extremely well fixed.  Overall bone quality of the femur was good.  During the initial exposure, a portion of the tibial tubercle did avulse.  We were able to repair it very well with 3 suture anchors following implantation of all the implants.      DESCRIPTION OF PROCEDURE:  The patient was placed on the operating room table in the supine position.  All of the bony prominences and neurovascular structures were well-padded.  We prepped and draped the operative lower extremity in the standard sterile fashion using sequential ChloraPrep solution and began by excising the old incision and creating full-thickness medial and lateral skin flaps to allow for a standard medial parapatellar arthrotomy, productive of benign-appearing synovial fluid.  A full synovectomy was performed and  a standard exposure was performed.  At this point we did notice a tibial avulsion.  See comments above.  We decided to move forward with the operation.  We then examined the femoral component, and the knee in general, with findings listed above.  We then took out the polyethylene component and then removed the femoral component without any bone loss, and checked the tibial component, which was well fixed.  We prepared the femur for the trials with reaming for the stem and cone and using the guides for our anterior, posterior chamfer, and distal cuts. We then trialed different augment combinations and polyethylene thicknesses until I felt that we had what I thought was an excellent combination of range of motion and stability in flexion, mid flexion, and extension.     We then removed all trial components, cleaned, and dried the bony surfaces, and prepared for cement using pulsatile saline mixed with antibiotic, gauze drying, and a one-eighth inch drill bit for sclerotic areas of bone.  We placed a cement restrictor in the femoral canal.  We inserted our press-fit cone into place.  We then cemented the component into place at the appropriate  interfaces, and impacted the component, and removed all excess cement.  We allowed the cement to fully cure while holding the femoral component in the desired position.  We then removed the trial polyethylene component, cleaned and dried the tibial tray, and placed the true tibial polyethylene component into place using the component that gave us the best combination of range of motion and stability in flexion, mid flexion, and extension.  We then checked the knee once more, and I felt that everything was appropriate as it had been in the trial phase.  At this point we did repair the avulsed tibial tubercle back down to its bony bed with three 5.5 mm suture anchors from [employer].  I was very happy with the repair.      We then profusely irrigated out the knee with pulsatile saline mixed with antibiotic and achieved hemostasis with electrocautery.  We then repaired the arthrotomy with a combination of #1 Vicryl and #2 Quill suture.  The subcutaneous tissue was closed in 2 layers using #1 and 2-0 Vicryl suture, and the skin was closed with a running 4-0 subcuticular Monocryl suture and SurgiSeal.  Sterile, dry, and gently compressive dressings were placed, the patient was then placed into a hinged knee brace locked in extension, and the patient was taken to the recovery room in good condition.  Following the case, the patient had palpable pedal pulses with brisk capillary refill in the toes, which was consistent with the preoperative exam.  I, Dr. Dow, was the attending for the case, and was present for the entirety of the procedure.    I certify that the services for which payment is claimed were medically necessary and no qualified resident or fellow was available to perform the services

## 2025-02-18 NOTE — ASSESSMENT & PLAN NOTE
Mild to moderate per echo done by his cardiologist in 2024  Cleared for surgery by cardiologist, note in EPIC  - Outpatient follow-up

## 2025-02-19 VITALS
BODY MASS INDEX: 26.68 KG/M2 | OXYGEN SATURATION: 97 % | HEART RATE: 66 BPM | SYSTOLIC BLOOD PRESSURE: 144 MMHG | WEIGHT: 166 LBS | DIASTOLIC BLOOD PRESSURE: 63 MMHG | TEMPERATURE: 97.6 F | RESPIRATION RATE: 18 BRPM | HEIGHT: 66 IN

## 2025-02-19 PROBLEM — D62 ACUTE BLOOD LOSS AS CAUSE OF POSTOPERATIVE ANEMIA: Status: ACTIVE | Noted: 2025-02-19

## 2025-02-19 LAB
ANION GAP SERPL CALC-SCNC: 6 MEQ/L (ref 3–15)
BUN SERPL-MCNC: 16 MG/DL (ref 7–25)
CALCIUM SERPL-MCNC: 7.8 MG/DL (ref 8.6–10.3)
CHLORIDE SERPL-SCNC: 107 MEQ/L (ref 98–107)
CO2 SERPL-SCNC: 24 MEQ/L (ref 21–31)
CREAT SERPL-MCNC: 0.7 MG/DL (ref 0.7–1.3)
EGFRCR SERPLBLD CKD-EPI 2021: >60 ML/MIN/1.73M*2
ERYTHROCYTE [DISTWIDTH] IN BLOOD BY AUTOMATED COUNT: 15.1 % (ref 11.6–14.4)
ERYTHROCYTE [DISTWIDTH] IN BLOOD BY AUTOMATED COUNT: 15.2 % (ref 11.6–14.4)
FUNGUS STAIN: NORMAL
GLUCOSE SERPL-MCNC: 98 MG/DL (ref 70–99)
HCT VFR BLD AUTO: 25.9 % (ref 40.1–51)
HCT VFR BLD AUTO: 28.6 % (ref 40.1–51)
HGB BLD-MCNC: 7.8 G/DL (ref 13.7–17.5)
HGB BLD-MCNC: 8.9 G/DL (ref 13.7–17.5)
MCH RBC QN AUTO: 28.6 PG (ref 28–33.2)
MCH RBC QN AUTO: 29.3 PG (ref 28–33.2)
MCHC RBC AUTO-ENTMCNC: 30.1 G/DL (ref 32.2–36.5)
MCHC RBC AUTO-ENTMCNC: 31.1 G/DL (ref 32.2–36.5)
MCV RBC AUTO: 94.1 FL (ref 83–98)
MCV RBC AUTO: 94.9 FL (ref 83–98)
PLATELET # BLD AUTO: 204 K/UL (ref 150–350)
PLATELET # BLD AUTO: 225 K/UL (ref 150–350)
PMV BLD AUTO: 8.8 FL (ref 9.4–12.4)
PMV BLD AUTO: 9 FL (ref 9.4–12.4)
POTASSIUM SERPL-SCNC: 4.3 MEQ/L (ref 3.5–5.1)
RBC # BLD AUTO: 2.73 M/UL (ref 4.5–5.8)
RBC # BLD AUTO: 3.04 M/UL (ref 4.5–5.8)
SODIUM SERPL-SCNC: 137 MEQ/L (ref 136–145)
WBC # BLD AUTO: 6.59 K/UL (ref 3.8–10.5)
WBC # BLD AUTO: 7.73 K/UL (ref 3.8–10.5)

## 2025-02-19 PROCEDURE — 97535 SELF CARE MNGMENT TRAINING: CPT | Mod: GO

## 2025-02-19 PROCEDURE — 99232 SBSQ HOSP IP/OBS MODERATE 35: CPT | Performed by: HOSPITALIST

## 2025-02-19 PROCEDURE — 80048 BASIC METABOLIC PNL TOTAL CA: CPT | Performed by: NURSE PRACTITIONER

## 2025-02-19 PROCEDURE — 97162 PT EVAL MOD COMPLEX 30 MIN: CPT | Mod: GP

## 2025-02-19 PROCEDURE — 97166 OT EVAL MOD COMPLEX 45 MIN: CPT | Mod: GO

## 2025-02-19 PROCEDURE — 25800000 HC PHARMACY IV SOLUTIONS: Performed by: NURSE PRACTITIONER

## 2025-02-19 PROCEDURE — 63700000 HC SELF-ADMINISTRABLE DRUG: Performed by: NURSE PRACTITIONER

## 2025-02-19 PROCEDURE — 36415 COLL VENOUS BLD VENIPUNCTURE: CPT | Performed by: NURSE PRACTITIONER

## 2025-02-19 PROCEDURE — 63600000 HC DRUGS/DETAIL CODE: Mod: JZ | Performed by: NURSE PRACTITIONER

## 2025-02-19 PROCEDURE — 85027 COMPLETE CBC AUTOMATED: CPT | Performed by: NURSE PRACTITIONER

## 2025-02-19 RX ORDER — TRAMADOL HYDROCHLORIDE 50 MG/1
50-100 TABLET ORAL EVERY 6 HOURS PRN
Qty: 30 TABLET | Refills: 0 | Status: SHIPPED | OUTPATIENT
Start: 2025-02-19 | End: 2025-02-24

## 2025-02-19 RX ORDER — OMEGA-3-ACID ETHYL ESTERS 1 G/1
CAPSULE, LIQUID FILLED ORAL
Start: 2025-03-05

## 2025-02-19 RX ORDER — ACETAMINOPHEN 325 MG/1
650 TABLET ORAL EVERY 6 HOURS PRN
Start: 2025-02-19

## 2025-02-19 RX ORDER — POLYETHYLENE GLYCOL 3350 17 G/17G
17 POWDER, FOR SOLUTION ORAL DAILY PRN
Start: 2025-02-19

## 2025-02-19 RX ORDER — AMOXICILLIN 250 MG
1 CAPSULE ORAL 2 TIMES DAILY
Start: 2025-02-19

## 2025-02-19 RX ORDER — RIVAROXABAN 20 MG/1
20 TABLET, FILM COATED ORAL
Start: 2025-02-24

## 2025-02-19 RX ADMIN — KETOROLAC TROMETHAMINE 15 MG: 15 INJECTION, SOLUTION INTRAMUSCULAR; INTRAVENOUS at 01:29

## 2025-02-19 RX ADMIN — ACETAMINOPHEN 650 MG: 325 TABLET ORAL at 01:28

## 2025-02-19 RX ADMIN — ACETAMINOPHEN 650 MG: 325 TABLET ORAL at 06:59

## 2025-02-19 RX ADMIN — DEXAMETHASONE SODIUM PHOSPHATE 8 MG: 4 INJECTION, SOLUTION INTRA-ARTICULAR; INTRALESIONAL; INTRAMUSCULAR; INTRAVENOUS; SOFT TISSUE at 05:27

## 2025-02-19 RX ADMIN — RIVAROXABAN 10 MG: 10 TABLET, FILM COATED ORAL at 08:50

## 2025-02-19 RX ADMIN — CEFAZOLIN 2 G: 2 INJECTION, POWDER, FOR SOLUTION INTRAMUSCULAR; INTRAVENOUS at 01:31

## 2025-02-19 RX ADMIN — EZETIMIBE 10 MG: 10 TABLET ORAL at 08:50

## 2025-02-19 RX ADMIN — CEFAZOLIN 2 G: 2 INJECTION, POWDER, FOR SOLUTION INTRAMUSCULAR; INTRAVENOUS at 08:51

## 2025-02-19 RX ADMIN — POLYETHYLENE GLYCOL 3350 17 G: 17 POWDER, FOR SOLUTION ORAL at 08:51

## 2025-02-19 RX ADMIN — ROSUVASTATIN CALCIUM 40 MG: 40 TABLET, FILM COATED ORAL at 08:50

## 2025-02-19 RX ADMIN — SENNOSIDES AND DOCUSATE SODIUM 1 TABLET: 50; 8.6 TABLET ORAL at 08:50

## 2025-02-19 ASSESSMENT — COGNITIVE AND FUNCTIONAL STATUS - GENERAL
AFFECT: WFL
CLIMB 3 TO 5 STEPS WITH RAILING: 4 - NONE
STANDING UP FROM CHAIR USING ARMS: 4 - NONE
HELP NEEDED FOR PERSONAL GROOMING: 4 - NONE
DRESSING REGULAR LOWER BODY CLOTHING: 3 - A LITTLE
EATING MEALS: 4 - NONE
WALKING IN HOSPITAL ROOM: 4 - NONE
MOVING TO AND FROM BED TO CHAIR: 4 - NONE
TOILETING: 4 - NONE
HELP NEEDED FOR BATHING: 4 - NONE
AFFECT: WFL
DRESSING REGULAR UPPER BODY CLOTHING: 4 - NONE

## 2025-02-19 NOTE — PROGRESS NOTES
Orthopaedics Progress Note    [S]  NAEON. Pain well controlled. Voiding spontaneously, tolerating PO. No new complaints. Did not work with PT yesterday.    [O]   Vitals:    02/19/25 0343   BP: (!) 147/66   Pulse: 70   Resp: 18   Temp: 36.5 °C (97.7 °F)   SpO2: 100%       Musculoskeletal:   Right Lower Extremity   Inspection: Surgical dressing is clean, dry, and intact. Appropriate tenderness around the knee, no obvious deformity or erythema  Motor: Fires TA/EHL/GS   Sensory: Intact to light touch in SPN/DPN/tibial/saphenous/sural distributions   Vascular: Palpable DP pulse. Toes perfused     [A/P]   84 y.o. male status post right total knee arthroplasty, 1 Day Post-Op, doing well    -DVT prophylaxis: Xarelto (home dose) > 1/2 dose for 4 days then transition to full dose  -Weight bearing as tolerated   -Analgesia  -Physical therapy    Pietro Thomas MD  Orthopaedic Surgery

## 2025-02-19 NOTE — PROGRESS NOTES
Occupational Therapy -  Initial Evaluation     Patient: Bertrand Maher Jr.  Location: [facility] 5P 5406  MRN: [id number]  Today's date: 2/19/2025    HISTORY OF PRESENT ILLNESS     Tre is a 84 y.o. male admitted on 2/18/2025 with Pain due to internal orthopedic prosthetic devices, implants and grafts, initial encounter (CMS/[facility]) [T84.84XA]  S/P revision of total knee, right [Z96.651]. Principal problem is S/P revision of total knee, right.    Past Medical History  Tre has a past medical history of Allergic rhinitis, Anemia, Asthma, Atrial fibrillation (CMS/[facility]), BPH (benign prostatic hyperplasia), Cataract, Cervical pain, Cervical spondylosis, Coronary artery stenosis, HOLM (dyspnea on exertion), H/O heart artery stent, Hematuria (01/2025), knee replacement, Hypercholesterolemia, Lumbar pain, Lumbar spondylosis, Osteoarthritis of hip, Osteoarthritis, hand, Other hypertrophic osteoarthropathy, multiple sites, Prostate cancer (CMS/[facility]) (2022), Skin cancer, Status post angioplasty, TIA (transient ischemic attack) (2023), and Urge incontinence.    History of Present Illness  S/p revision R TKA on 2/18/2025  50% weight bearing RLE with walker in hinged knee brace locked in extension. Maintain hinged knee brace locked in extension at all times, no knee ROM permitted    PRIOR LEVEL OF FUNCTION AND LIVING ENVIRONMENT     Prior Level of Function      Flowsheet Row Most Recent Value   Dominant Hand right   Ambulation independent   Transferring independent   Toileting independent   Bathing independent   Dressing independent   Eating independent   IADLs independent   Driving/Transportation    Communication understands/communicates without difficulty   Prior Level of Function Comment Patient reports he intermittently ambulates using a SPC, independent for ADLs, retired   Assistive Device Currently Used at Home cane, straight             Prior Living Environment      Flowsheet Row Most  Recent Value   People in Home spouse   Current Living Arrangements independent living facility, home   Home Accessibility wheelchair accessible   Living Environment Comment 1SH, no KACY, stall shower          Occupational Profile      Flowsheet Row Most Recent Value   Successful Occupations retired from [employer]   Environmental Supports and Barriers supportive spouse          VITALS AND PAIN     OT Vitals      Date/Time Pulse HR Source SpO2 Pt Activity O2 Therapy BP BP Location BP Method Pt Position [facility]   02/19/25 0926 -- -- -- -- -- 157/65 -- -- -- [place]   02/19/25 0926 81 Monitor 97 % At rest None (Room air) -- -- -- -- OMB   02/19/25 0947 83 Monitor 98 % At rest None (Room air) 169/74 Left upper arm Automatic Sitting OMB          OT Pain      Date/Time Pain Type Side/Orientation Location Rating: Rest Rating: Activity Interventions [facility]   02/19/25 0926 Pain Assessment knee right 2 - mild pain 4 - moderate pain care clustered LA             Objective   OBJECTIVE     Start time:  0925  End time:  0949  Session Length: 24 min  Mode of Treatment: co-treatment  Reason for co-treatment: to expedite d/c    General Observations  Patient received reclined, in chair. He was agreeable to therapy, no issues or concerns identified by nurse prior to session.      Precautions: fall, brace worn at all times, weight bearing (HKB locked in extension, no knee ROM)  Extremity Weight-Bearing Status: right lower extremity  Right LE Weight-Bearing Status: partial weight-bearing (PWB) (50% WB)           OT Eval and Treat - 02/19/25 0925          Cognition    Orientation Status oriented x 4     Affect/Mental Status WFL     Follows Commands WFL     Cognitive Function WFL        Vision Assessment/Intervention    Vision Assessment corrective lenses full-time        Hearing Assessment    Hearing Status WFL        Sensory Assessment    Sensory Assessment sensation intact, upper extremities        Upper Extremity Assessment    UE Assessment ROM and  Strength WFL        Bed Mobility    Bed Mobility Activities supine to sit;sit to supine     [place] independent     Assistive Device none     Comment OOB to L; utilized heel hook technique        Mobility Belt    Mobility Belt Used During Session no - independent with all mobility        Sit/Stand Transfer    Surface chair with arm rests;edge of bed     [place] modified independence     Assistive Device walker, front-wheeled        Toilet Transfer    Transfer Technique sit/stand     [place] modified independence     Assistive Device walker, front-wheeled     Comment to/from standard height toilet        Shower/Tub Transfer    Transfer Type Shower     Transfer Technique --   backwards entry    [place] modified independence     Assistive Device walker, front-wheeled     Comment recommend supervision from spouse initially        Functional Mobility    Distance in therapy gym     Functional Mobility [place] modified independence     Assistive Device walker, front-wheeled     Functional Mobility Comments short household distances        Lower Body Dressing    Comment educated on dressing operated LE first, undress second; educated on LHAE, verbalized understanding - reports spouse can assist at home        Balance    Static Sitting Balance WFL     Dynamic Sitting Balance WFL     Sit to Stand Dynamic Balance WFL     Static Standing Balance WFL     Dynamic Standing Balance WFL;supported     Comment, Balance with RW                                      Orthosis Knee Right Knee Orthosis (Active)   Date Obtained/Time Obtained: 02/18/25 0700   Location: Knee  Laterality: Right  Type: Knee Orthosis  Features: Hinged with Dial Lock  Description: locked in extension  Therapeutic Indications: positioning/protection;stabilization and support  Wearing ...     Orthosis Knee Right Knee Orthosis (Active)   Skin Assessment intact;no redness 02/19/25 1242   Compliance/Wearing Issues patient;compliant with wearing  schedule 02/19/25 1242       Education Documentation  Treatment Plan, taught by Connie Olmos OT at 2/19/2025 12:51 PM.  Learner: Patient  Readiness: Acceptance  Method: Explanation  Response: Verbalizes Understanding  Comment: role of OT, POC, DC    Rehabilitation Therapy, taught by Connie Olmos OT at 2/19/2025 12:51 PM.  Learner: Patient  Readiness: Acceptance  Method: Explanation  Response: Verbalizes Understanding  Comment: role of OT, POC, DC        Session Outcome  Patient reclined, in chair, in therapy gym at end of session, returned to room by therapy aide. Nursing notified about patient's performance and patient's position.    AM-PAC™ - ADL (Current Function)     Putting on/taking off regular lower body clothing 3 - A Little   Bathing 4 - None   Toileting 4 - None   Putting on/taking off regular upper body clothing 4 - None   Help for taking care of personal grooming 4 - None   Eating meals 4 - None   AM-PAC™ ADL Score 23      ASSESSMENT AND PLAN     Progress Summary  OT eval complete. Pt reports I wtih ADLs and mod I for ambulation with SPC. Pt lives in 1SH with spouse, WIS+GBs. Today, pt independent with bed mobility and mod I for functional transfers. Pt performed functional ambulation short household distances with RW. Pt demonstrated modified independence with ADLs observed. Spouse able to assist as needed. No further acute OT needs identified. Rec d/c home with assistance when medically cleared.    Patient/Family Therapy Goal Statement: to go home    OT Plan      Flowsheet Row Most Recent Value   Therapy Frequency evaluation only at 02/19/2025 0925            OT Discharge Recommendations      Flowsheet Row Most Recent Value   OT Recommended Discharge Disposition home with assistance at 02/19/2025 0925   Anticipated Equipment Needs if Discharged Home (OT) other (see comments)  [LHAE] at 02/19/2025 0925

## 2025-02-19 NOTE — HOSPITAL COURSE
Ter is a 84 y.o. male admitted on 2/18/2025 with Pain due to internal orthopedic prosthetic devices, implants and grafts, initial encounter (CMS/[facility]) [T84.84XA]  S/P revision of total knee, right [Z96.651]. Principal problem is S/P revision of total knee, right.    Past Medical History  Tre has a past medical history of Allergic rhinitis, Anemia, Asthma, Atrial fibrillation (CMS/[facility]), BPH (benign prostatic hyperplasia), Cataract, Cervical pain, Cervical spondylosis, Coronary artery stenosis, HOLM (dyspnea on exertion), H/O heart artery stent, Hematuria (01/2025), knee replacement, Hypercholesterolemia, Lumbar pain, Lumbar spondylosis, Osteoarthritis of hip, Osteoarthritis, hand, Other hypertrophic osteoarthropathy, multiple sites, Prostate cancer (CMS/[facility]) (2022), Skin cancer, Status post angioplasty, TIA (transient ischemic attack) (2023), and Urge incontinence.    History of Present Illness  S/p revision R TKA on 2/18/2025  50% weight bearing RLE with walker in hinged knee brace locked in extension. Maintain hinged knee brace locked in extension at all times, no knee ROM permitted

## 2025-02-19 NOTE — ANESTHESIA POSTPROCEDURE EVALUATION
Patient: Bertrand Maher Jr.    Procedure Summary       Date: 02/18/25 Room / Location: [facility] PAV OR 03 / [facility] OR PAV    Anesthesia Start: 1337 Anesthesia Stop: 1759    Procedure: KNEE TOTAL REVISION (Right: Knee) Diagnosis:       Pain due to internal orthopedic prosthetic devices, implants and grafts, initial encounter (CMS/[facility])      (Pain due to internal orthopedic prosthetic devices, implants and grafts, initial encounter (CMS/[facility]) [T84.84XA])    Surgeons: Jurgen Dow MD Responsible Provider: Arthur Ramos MD    Anesthesia Type: general ASA Status: 3            Anesthesia Type: general  PACU Vitals  2/18/2025 1749 - 2/18/2025 1849        2/18/2025  1754 2/18/2025  1755 2/18/2025  1800 2/18/2025  1805    BP: -- 158/74 158/75 --    Temp: 36.8 °C (98.3 °F) -- -- --    Pulse: -- 81 81 81    Resp: -- 14 19 16    SpO2: -- -- -- --                2/18/2025  1810 2/18/2025  1815 2/18/2025  1818 2/18/2025  1819    BP: 159/75 -- 159/75 161/74    Temp: -- -- -- --    Pulse: 81 81 81 80    Resp: 21 23 14 15    SpO2: -- -- 99 % 100 %                2/18/2025  1820 2/18/2025  1830 2/18/2025  1840 2/18/2025  1845    BP: -- 159/71 154/72 --    Temp: -- -- -- --    Pulse: 80 80 80 --    Resp: 11 24 23 12    SpO2: 99 % 99 % 99 % --              Anesthesia Post Evaluation    Pain score: 4  Pain management: satisfactory to patient  Mode of pain management: IV medication  Patient location during evaluation: PACU  Patient participation: complete - patient participated  Level of consciousness: awake  Cardiovascular status: acceptable  Airway Patency: adequate  Respiratory status: acceptable  Hydration status: stable  Anesthetic complications: no

## 2025-02-19 NOTE — PLAN OF CARE
Problem: Adult Inpatient Plan of Care  Goal: Plan of Care Review  Flowsheets (Taken 2/19/2025 1251)  Progress: improving  Outcome Evaluation: OT eval complete  Plan of Care Reviewed With: patient

## 2025-02-19 NOTE — CONSULTS
Hospital Medicine     Daily Progress Note                SUBJECTIVE   Interval History:   Patient is doing well today. He reports manageable pain.   He denies HA, dizziness, CP, dyspnea, abdominal pain, N/V.   Hgb 7.8; Ortho is rechecking CBC. If hgb <8 recommend transfusion.      OBJECTIVE      Vital signs in last 24 hours:  Temp:  [36.2 °C (97.2 °F)-36.8 °C (98.3 °F)] 36.2 °C (97.2 °F)  Heart Rate:  [67-81] 67  Resp:  [11-24] 18  BP: (134-168)/(58-77) 134/64    Intake/Output Summary (Last 24 hours) at 2/19/2025 1051  Last data filed at 2/19/2025 0715  Gross per 24 hour   Intake 2749.18 ml   Output 750 ml   Net 1999.18 ml         PHYSICAL EXAMINATION      Physical Exam  Vitals and nursing note reviewed.   Constitutional:       Appearance: Normal appearance.   HENT:      Head: Normocephalic and atraumatic.   Eyes:      Conjunctiva/sclera: Conjunctivae normal.   Cardiovascular:      Rate and Rhythm: Normal rate and regular rhythm.      Heart sounds: Normal heart sounds.   Pulmonary:      Effort: Pulmonary effort is normal. No respiratory distress.      Breath sounds: Normal breath sounds.   Abdominal:      General: Bowel sounds are normal. There is no distension.      Palpations: Abdomen is soft.      Tenderness: There is no abdominal tenderness.   Musculoskeletal:      Cervical back: Neck supple.      Comments: RLE neurovascularly intact distally   Skin:     General: Skin is warm and dry.   Neurological:      General: No focal deficit present.      Mental Status: He is oriented to person, place, and time.   Psychiatric:         Mood and Affect: Mood normal.            LINES, CATHETERS, DRAINS, AIRWAYS, AND WOUNDS   Lines, Drains, and Airways:  Wounds (agree with documentation and present on admission):  Peripheral IV (Adult) 02/18/25 Left;Posterior Wrist (Active)   Number of days: 1       Surgical Incision Knee Anterior;Right (Active)   Number of days: 1         Comments:    LABS / IMAGING / TELE       Labs  Results from last 7 days   Lab Units 02/19/25  0346 02/13/25  1437   SODIUM mEQ/L 137 138   POTASSIUM mEQ/L 4.3 4.3   CHLORIDE mEQ/L 107 102   CO2 mEQ/L 24 28   BUN mg/dL 16 20   CREATININE mg/dL 0.7 0.8   CALCIUM mg/dL 7.8* 9.4   ALBUMIN g/dL  --  4.1   BILIRUBIN TOTAL mg/dL  --  1.0   ALK PHOS IU/L  --  80   ALT IU/L  --  18   AST IU/L  --  26   GLUCOSE mg/dL 98 86     Results from last 7 days   Lab Units 02/19/25  0346 02/13/25  1437   WBC K/uL 6.59 6.80   HEMOGLOBIN g/dL 7.8* 11.5*   HEMATOCRIT % 25.9* 36.5*   PLATELETS K/uL 204 279     Microbiology Results       Procedure Component Value Units Date/Time    Anaerobic Culture / Smear (includes Aerobic Culture) Knee, Right [[id number]] Collected: 02/18/25 1607    Specimen: Tissue from Knee, Right Updated: 02/18/25 2323     Gram Stain Result 1+ WBC      No organisms seen    Anaerobic Culture / Smear (includes Aerobic Culture) Knee, Right [[id number]] Collected: 02/18/25 1512    Specimen: Tissue from Knee, Right Updated: 02/18/25 2328     Gram Stain Result No WBC Seen      No organisms seen    Anaerobic Culture / Smear (includes Aerobic Culture) Knee, Right [[id number]] Collected: 02/18/25 1426    Specimen: Tissue from Knee, Right Updated: 02/18/25 2325     Gram Stain Result No WBC Seen      No organisms seen          Above labs have been personally reviewed.     ECG/Telemetry  AF    ASSESSMENT AND PLAN        Assessment & Plan  Status post revision of total replacement of right knee  s/p Revision RTKA on 2/18/25  - Management as per Ortho  - Pain control, DVT ppx, WBS, PT/OT as per Ortho  - Encourage IS  - Recommend bowel regimen  Asthma  Mild intermittent, no current exacerbation  - Supportive care  Atrial fibrillation (CMS/HCC)  Persistent, on Xarelto as an outpatient  Not on any rate controlling medication  Was transitioned to Lovenox 3day pre-op  - Resume Xarelto post-op when cleared by Ortho  - K>4, Mg >2  - Monitor on tele for 24h post-op  CAD (coronary  artery disease)  CAD w/ h/o stent  - Continue ASA and statin  Aortic stenosis  Mild to moderate per echo done by his cardiologist in 2024  Cleared for surgery by cardiologist, note in EPIC  - Outpatient follow-up  Acute blood loss as cause of postoperative anemia  Noted 3.7drop in hgb; 2/2 acute expected blood loss from surgery and dilutional from IVF  Hgb 7.8  -  Recheck CBC  - If remains <8, would recommende transfusion, defer to Ortho  - Monitor CBC  - DVT ppx as per Ortho    VTE Assessment: [place]    VTE Prophylaxis:  Current anticoagulants:  rivaroxaban (XARELTO) tablet 10 mg, oral, Daily      Code Status: Full Code      Estimated Discharge Date: 2/20/2025   Disposition Planning: as per Ortho          CHIQUIS Fernandez  2/19/2025

## 2025-02-19 NOTE — PLAN OF CARE
Problem: Adult Inpatient Plan of Care  Goal: Plan of Care Review  Outcome: Progressing  Flowsheets (Taken 2/19/2025 0532)  Progress: no change  Outcome Evaluation: RLE in immobilizer. Pain controlled with tylenol and toradol. Voiding in urinal. VSS on 2L. Plan to work with PT/OT today.  Plan of Care Reviewed With: patient

## 2025-02-19 NOTE — PROGRESS NOTES
Physical Therapy -  Initial Evaluation     Patient: Bertrand Maher Jr.  Location: [facility] 5P 5406  MRN: [id number]  Today's date: 2/19/2025    HISTORY OF PRESENT ILLNESS     Tre is a 84 y.o. male admitted on 2/18/2025 with Pain due to internal orthopedic prosthetic devices, implants and grafts, initial encounter (CMS/[facility]) [T84.84XA]  S/P revision of total knee, right [Z96.651]. Principal problem is S/P revision of total knee, right.    Past Medical History  Tre has a past medical history of Allergic rhinitis, Anemia, Asthma, Atrial fibrillation (CMS/[facility]), BPH (benign prostatic hyperplasia), Cataract, Cervical pain, Cervical spondylosis, Coronary artery stenosis, HOLM (dyspnea on exertion), H/O heart artery stent, Hematuria (01/2025), knee replacement, Hypercholesterolemia, Lumbar pain, Lumbar spondylosis, Osteoarthritis of hip, Osteoarthritis, hand, Other hypertrophic osteoarthropathy, multiple sites, Prostate cancer (CMS/[facility]) (2022), Skin cancer, Status post angioplasty, TIA (transient ischemic attack) (2023), and Urge incontinence.    History of Present Illness  S/p revision R TKA on 2/18/2025  50% weight bearing RLE with walker in hinged knee brace locked in extension. Maintain hinged knee brace locked in extension at all times, no knee ROM permitted    PRIOR LEVEL OF FUNCTION AND LIVING ENVIRONMENT     Prior Level of Function      Flowsheet Row Most Recent Value   Dominant Hand right   Ambulation independent   Transferring independent   Toileting independent   Bathing independent   Dressing independent   Eating independent   IADLs independent   Driving/Transportation    Communication understands/communicates without difficulty   Prior Level of Function Comment Patient reports he intermittently ambulates using a SPC, independent for ADLs, retired   Assistive Device Currently Used at Home cane, straight             Prior Living Environment      Flowsheet Row Most Recent  Value   People in Home spouse   Current Living Arrangements independent living facility, home   Home Accessibility wheelchair accessible   Living Environment Comment 1SH, no KACY, stall shower          VITALS AND PAIN     PT Vitals      Date/Time BP [facility]   02/19/25 0926 157/65 [place]          PT Pain      Date/Time Pain Type Side/Orientation Location Rating: Rest Rating: Activity Interventions [facility]   02/19/25 0926 Pain Assessment right knee 2 - mild pain 4 - moderate pain care clustered [place]             Objective   OBJECTIVE     Start time:  0926  End time:  0938  Session Length: 12 min  Mode of Treatment: co-treatment  Reason for co-treatment: to expedite DC    General Observations  Patient received upright, in chair. He was no issues or concerns identified by nurse prior to session, agreeable to therapy.      Precautions: fall, brace worn at all times, weight bearing (HKB locked in extension, no knee ROM)  Extremity Weight-Bearing Status: right lower extremity  Right LE Weight-Bearing Status: partial weight-bearing (PWB)           PT Eval and Treat - 02/19/25 0926          Cognition    Orientation Status oriented x 4     Affect/Mental Status WFL     Follows Commands WFL     Cognitive Function WFL        Vision Assessment/Intervention    Vision Assessment WFL        Hearing Assessment    Hearing Status WFL        Sensory Assessment    Sensory Assessment sensation intact, lower extremities        Lower Extremity Range of Motion    Knee, Right (ROM) no ROM permitted        Mobility Belt    Mobility Belt Used During Session no - independent with all mobility        Sit/Stand Transfer    Surface chair with arm rests     Nixon modified independence     Assistive Device walker, front-wheeled     Transfer Comments from bedside recliner, no (A) needed, no overt LOB, maintains WB Precautions        Car Transfer    Transfer Technique sit-stand;stand-sit     Nixon modified independence     Assistive Device walker,  front-wheeled     Comment car height simulated to pt's for DC home; completed transfer without assist or difficulty        Gait Training    Marshall, Gait modified independence     Assistive Device walker, front-wheeled     Distance in Feet 45 feet     Pattern step-to     Deviations/Abnormal Patterns weight shifting decreased;hiral decreased;step length decreased;stride length decreased;antalgic     Comment mod indep with RW, no overt LOB, cues for 50% WB to RLE- maintains WB precautions        Stairs Training    Marshall, Stairs other (see comments)     Comment 1SH, no KACY        Balance    Static Sitting Balance WFL     Dynamic Sitting Balance WFL     Sit to Stand Dynamic Balance WFL     Static Standing Balance WFL     Dynamic Standing Balance WFL     Comment, Balance no overt LOB        Lower Extremity (Therapeutic Exercise)    Exercise Position/Type seated;AROM (active range of motion)     General Exercise bilateral;ankle pumps;gluteal sets     Comment reviewed frequency, technique and benefits of Maicol                      10 Meter Walk Test (Self-Selected Velocity)  Trial One: Ten Meter Walk Test (sec): 0 seconds  Trial Two: Ten Meter Walk Test (sec): 0 seconds (due to WB restrictions)                       Education Documentation  Unresolved/Worsening Symptoms, taught by Yolis Campbell PT at 2/19/2025 12:09 PM.  Learner: Patient  Readiness: Acceptance  Method: Explanation, Demonstration  Response: Verbalizes Understanding, Demonstrated Understanding  Comment: reviewed PT POC, role of PT, car tx, ROM and WB restrictions, Maicol    Joint Mobility/Strength, taught by Yolis Campbell PT at 2/19/2025 12:09 PM.  Learner: Patient  Readiness: Acceptance  Method: Explanation, Demonstration  Response: Verbalizes Understanding, Demonstrated Understanding  Comment: reviewed PT POC, role of PT, car tx, ROM and WB restrictions, Maicol    Home Safety, taught by Yolis Campbell PT at 2/19/2025 12:09 PM.  Learner:  Patient  Readiness: Acceptance  Method: Explanation, Demonstration  Response: Verbalizes Understanding, Demonstrated Understanding  Comment: reviewed PT POC, role of PT, car tx, ROM and WB restrictions, Maicol    Energy Conservation, taught by Yolis Campbell PT at 2/19/2025 12:09 PM.  Learner: Patient  Readiness: Acceptance  Method: Explanation, Demonstration  Response: Verbalizes Understanding, Demonstrated Understanding  Comment: reviewed PT POC, role of PT, car tx, ROM and WB restrictions, Maicol    Assistive/Adaptive Devices, taught by Yolis Campbell PT at 2/19/2025 12:09 PM.  Learner: Patient  Readiness: Acceptance  Method: Explanation, Demonstration  Response: Verbalizes Understanding, Demonstrated Understanding  Comment: reviewed PT POC, role of PT, car tx, ROM and WB restrictions, Maicol    Signs/Symptoms, taught by Yolis Campbell PT at 2/19/2025 12:09 PM.  Learner: Patient  Readiness: Acceptance  Method: Explanation, Demonstration  Response: Verbalizes Understanding, Demonstrated Understanding  Comment: reviewed PT POC, role of PT, car tx, ROM and WB restrictions, Maicol    Risk Factors, taught by Yolis Campbell PT at 2/19/2025 12:09 PM.  Learner: Patient  Readiness: Acceptance  Method: Explanation, Demonstration  Response: Verbalizes Understanding, Demonstrated Understanding  Comment: reviewed PT POC, role of PT, car tx, ROM and WB restrictions, Maicol        Session Outcome  Patient reclined, in chair at end of session, all needs met. Nursing notified about patient's position, patient's response to therapy/activity, and patient's performance.    AM-PAC™ - Mobility (Current Function)     Turning form your back to your side while in flat bed without using bedrails 4 - None   Moving from lying on your back to sitting on the side of a flat bed without using bedrails 4 - None   Moving to and from a bed to a chair 4 - None   Standing up from a chair using your arms 4 - None   To walk in a hospital room 4 -  None   Climbing 3-5 steps with a railing 4 - None   AM-PAC™ Mobility Score 24      ASSESSMENT AND PLAN     Progress Summary  2/19/25 Pt is a 84 year old male s/p revision R TKA post op day one. Pt seen for PT session. Pt maintains WB precautions well. The patient was educated on the role of PT in the acute care setting. The patient was instructed on proper technique when performing OOB transfers to ensure their safety. Gait training was performed using the necessary AD along with instructing the patient regarding the proper set up and use of the device. All questions and concerns addressed and answered during session. Pt tolerated session wellThe pt can complete functional tasks including performing transfers from low surface using RW without assist- completed car tx without (A); provided education regarding Maicol and ROM restrictions. VSS. - pt verbalized understanding; no further skilled PT needs identified; pt plans to return home at [place]         PT Plan      Flowsheet Row Most Recent Value   Therapy Frequency evaluation only at 02/19/2025 0926            PT Discharge Recommendations      Flowsheet Row Most Recent Value   PT Recommended Discharge Disposition home with assistance at 02/19/2025 0926   Anticipated Equipment Needs if Discharged Home (PT) walker, front-wheeled at 02/19/2025 0926            Therapy Durable Medical Equipment  Vendor: [employer]  Equipment Provided: Walker, with Wheels, Adult

## 2025-02-19 NOTE — PROGRESS NOTES
Orthopaedic Surgery Post-Operative Check    Interval History:  Patient is resting comfortably in bed. Pain currently well controlled. Patient responds to questions appropriately and follows commands.    Vital Signs:   Vitals:    02/18/25 2016   BP: (!) 163/77   Pulse: 80   Resp: 16   Temp: 36.3 °C (97.3 °F)   SpO2: 99%       Physical Exam:  Inspection: Surgical dressing in place, dressing is clean, dry and intact  Motor: Flexes and extends the ankle and toes  Sensory: Intact to light touch in SPN/DPN/tibial/saphenous/sural distributions   Vascular: Palpable DP pulse. Toes perfused  Compartments soft and compressible     Assessment and Plan  84 y.o. male status post revision R TKA    - DVT prophylaxis  - 50% weight bearing RLE with walker in hinged knee brace locked in extension  -- Maintain hinged knee brace locked in extension at all times, no knee ROM permitted  - Analgesia  - Physical therapy    Mitch Wilhelm MD  Orthopaedic Surgery

## 2025-02-19 NOTE — ASSESSMENT & PLAN NOTE
Noted 3.7drop in hgb; 2/2 acute expected blood loss from surgery and dilutional from IVF  Hgb 7.8  -  Recheck CBC  - If remains <8, would recommende transfusion, defer to Ortho  - Monitor CBC  - DVT ppx as per Ortho

## 2025-02-19 NOTE — PLAN OF CARE
Problem: Adult Inpatient Plan of Care  Goal: Plan of Care Review  Outcome: Progressing  Flowsheets (Taken 2/19/2025 1208)  Outcome Evaluation:   2/19/25 Pt is a 84 year old male s/p revision R TKA post op day one. Pt seen for PT session. Pt maintains WB precautions well. The patient was educated on the role of PT in the acute care setting. The patient was instructed on proper technique when performing OOB transfers to ensure their safety. Gait training was performed using the necessary AD along with instructing the patient regarding the proper set up and use of the device. All questions and concerns addressed and answered during session. Pt tolerated session wellThe pt can complete functional tasks including performing transfers from low surface using RW without assist- completed car tx without (A)   provided education regarding Maicol and ROM restrictions. VSS. - pt verbalized understanding   no further skilled PT needs identified   pt plans to return home at [place]  Plan of Care Reviewed With: patient

## 2025-02-19 NOTE — PLAN OF CARE
Care Coordination Admission Assessment Note    General Information:  Readmission Within the last 30 days: no previous admission in last 30 days  Does patient have a : No  Patient-Specific Goals (include timeframe): Pt will d/c to home when medically stable    Living Arrangements:  Arrived From: home  Current Living Arrangements: independent living facility, home  People in Home: spouse  Home Accessibility: wheelchair accessible  Living Arrangement Comments: Pt lives in [facility] with wife, no KACY    Housing Stability and Utility Access (SDOH):  In the last 12 months, was there a time when you were not able to pay the mortgage or rent on time?: No  In the past 12 months, how many times have you moved?: 1  At any time in the past 12 months, were you homeless or living in a shelter (including now)?: No  In the past 12 months has the electric, gas, oil, or water company threatened to shut off services in your home?: No    Functional Status Prior to Admission:   Assistive Device/Animal Currently Used at Home: cancarlton zhang  Functional Status Comments: Independent  IADL Comments: Independent     Supports and Services:  Current Outpatient/Agency/Support Group: none  Type of Current Home Care Services:n/a    History of home care episode or rehab stay: Denies    Discharge Needs Assessment:   Concerns to be Addressed: no discharge needs identified  Current Discharge Risk: physical impairment  Anticipated Changes Related to Illness: none    Patient/Family Anticipated Discharge Plan:  Patient/Family Anticipates Transition To: home  Patient/Family Anticipated Services at Transition: none    Connection to Community  Not applicable    Patient Choice:   Offered/Gave Vendor List: yes  Patient and/or patient guardian/advocate was made aware of their right to choose a provider. A list of eligible providers was presented and reviewed with the patient and/or patient guardian/advocate in written and/or verbal form. The  list delineates providers in the patient’s desired geographic area who are participating in the Medicare program and/or providers contracted with the patient’s primary insurance. The Medicare list and quality ratings were obtained from the Medicare.gov [medicare.gov] website.    Anticipated Discharge Plan:  Met with patient. Provided education and contact information for Care Coordination services.: yes  Anticipated Discharge Disposition: independent living facility, home without assistance or services     Transportation Needs (SDOH):  Transportation Concerns: none  Transportation Anticipated: family or friend will provide  Is Out of Hospital DNR needed at discharge?: no    In the past 12 months, has lack of transportation kept you from medical appointments or from getting medications?: No  In the past 12 months, has lack of transportation kept you from meetings, work, or from getting things needed for daily living?: No    Concerns - comments: Met with pt to complete CMA for covering CM. Verified pt's contacts, pharmacy, demographics and insurance with pt. Pt navigated home. No d/c needs identified at time of assessment.    [facility] [place]  [facility]

## 2025-02-19 NOTE — PLAN OF CARE
Care Coordination Discharge Plan Summary    Admission Assessment Summary    General Information  Readmission Within the last 30 days: no previous admission in last 30 days  Does patient have a : No  Patient-Specific Goals (include timeframe): Pt will d/c to home when medically stable    Living Arrangements  Arrived From: home  Current Living Arrangements: independent living facility, home  People in Home: spouse  Home Accessibility: wheelchair accessible  Living Arrangement Comments: Pt lives in [facility] with wife, no KACY    Social Drivers of Health - Screenings  Housing Stability  In the last 12 months, was there a time when you were not able to pay the mortgage or rent on time?: No  In the past 12 months, how many times have you moved where you were living?: 1  At any time in the past 12 months, were you homeless or living in a shelter (including now)?: No  Utility Access  In the past 12 months has the electric, gas, oil, or water company threatened to shut off services in your home?: No  Transportation Needs  In the past 12 months, has lack of transportation kept you from medical appointments or from getting medications?: No  In the past 12 months, has lack of transportation kept you from meetings, work, or from getting things needed for daily living?: No    Functional Status Prior to Admission  Assistive Device/Animal Currently Used at Home: cane, straight  Functional Status Comments: Independent  IADL Comments: Independent    Discharge Needs Assessment    Concerns to be Addressed: no discharge needs identified  Current Discharge Risk: physical impairment  Anticipated Changes Related to Illness: none    Discharge Plan Summary    Patient Choice  Offered/Gave Vendor List: yes  Patient and/or patient guardian/advocate was made aware of their right to choose a provider. A list of eligible providers was presented and reviewed with the patient and/or patient guardian/advocate in written and/or verbal  form. The list delineates providers in the patient’s desired geographic area who are participating in the Medicare program and/or providers contracted with the patient’s primary insurance. The Medicare list and quality ratings were obtained from the Medicare.gov [medicare.gov] website.    Discharge Plan:  Disposition/Destination: Home  / Home       Connection to Community  Not applicable  Community Resources:      Discharge Transportation:  Is Out of Hospital DNR needed at Discharge: no  Does patient need discharge transport?  no

## 2025-02-23 LAB
GRAM STN SPEC: NORMAL
MICROORGANISM SPEC CULT: NORMAL

## 2025-03-17 LAB
FUNGUS SPEC CULT: NORMAL

## 2025-04-15 ENCOUNTER — APPOINTMENT (EMERGENCY)
Dept: RADIOLOGY | Facility: HOSPITAL | Age: 85
End: 2025-04-15
Payer: COMMERCIAL

## 2025-04-15 ENCOUNTER — HOSPITAL ENCOUNTER (EMERGENCY)
Facility: HOSPITAL | Age: 85
Discharge: HOME | End: 2025-04-15
Attending: EMERGENCY MEDICINE | Admitting: EMERGENCY MEDICINE
Payer: COMMERCIAL

## 2025-04-15 VITALS
HEART RATE: 64 BPM | OXYGEN SATURATION: 97 % | TEMPERATURE: 98.3 F | DIASTOLIC BLOOD PRESSURE: 76 MMHG | RESPIRATION RATE: 16 BRPM | WEIGHT: 175 LBS | SYSTOLIC BLOOD PRESSURE: 167 MMHG | HEIGHT: 66 IN | BODY MASS INDEX: 28.12 KG/M2

## 2025-04-15 DIAGNOSIS — D64.9 SYMPTOMATIC ANEMIA: Primary | ICD-10-CM

## 2025-04-15 LAB
ABO + RH BLD: NORMAL
ACANTHOCYTES BLD QL SMEAR: ABNORMAL
ALBUMIN SERPL-MCNC: 3.9 G/DL (ref 3.5–5.7)
ALP SERPL-CCNC: 73 IU/L (ref 34–125)
ALT SERPL-CCNC: 11 IU/L (ref 7–52)
ANION GAP SERPL CALC-SCNC: 6 MEQ/L (ref 3–15)
AST SERPL-CCNC: 19 IU/L (ref 13–39)
BASOPHILS # BLD: 0.04 K/UL (ref 0.01–0.1)
BASOPHILS NFR BLD: 0.6 %
BILIRUB SERPL-MCNC: 1.4 MG/DL (ref 0.3–1.2)
BLD GP AB SCN SERPL QL: NEGATIVE
BUN SERPL-MCNC: 17 MG/DL (ref 7–25)
CALCIUM SERPL-MCNC: 8.9 MG/DL (ref 8.6–10.3)
CHLORIDE SERPL-SCNC: 107 MEQ/L (ref 98–107)
CO2 SERPL-SCNC: 25 MEQ/L (ref 21–31)
CREAT SERPL-MCNC: 0.8 MG/DL (ref 0.7–1.3)
D AG BLD QL: POSITIVE
DIFFERENTIAL METHOD BLD: ABNORMAL
EGFRCR SERPLBLD CKD-EPI 2021: >60 ML/MIN/1.73M*2
EOSINOPHIL # BLD: 0.14 K/UL (ref 0.04–0.54)
EOSINOPHIL NFR BLD: 2 %
ERYTHROCYTE [DISTWIDTH] IN BLOOD BY AUTOMATED COUNT: 15.6 % (ref 11.6–14.4)
FLUAV RNA SPEC QL NAA+PROBE: NEGATIVE
FLUBV RNA SPEC QL NAA+PROBE: NEGATIVE
GLUCOSE SERPL-MCNC: 105 MG/DL (ref 70–99)
HCT VFR BLD AUTO: 24.2 % (ref 40.1–51)
HGB BLD-MCNC: 7 G/DL (ref 13.7–17.5)
HYPOCHROMIA BLD QL SMEAR: ABNORMAL
IMM GRANULOCYTES # BLD AUTO: 0.02 K/UL (ref 0–0.08)
IMM GRANULOCYTES NFR BLD AUTO: 0.3 %
LABORATORY COMMENT REPORT: NORMAL
LYMPHOCYTES # BLD: 0.53 K/UL (ref 1.2–3.5)
LYMPHOCYTES NFR BLD: 7.6 %
MCH RBC QN AUTO: 25.7 PG (ref 28–33.2)
MCHC RBC AUTO-ENTMCNC: 28.9 G/DL (ref 32.2–36.5)
MCV RBC AUTO: 89 FL (ref 83–98)
MONOCYTES # BLD: 0.87 K/UL (ref 0.3–1)
MONOCYTES NFR BLD: 12.5 %
NEUTROPHILS # BLD: 5.36 K/UL (ref 1.7–7)
NEUTS SEG NFR BLD: 77 %
NRBC BLD-RTO: 0.3 %
OVALOCYTES BLD QL SMEAR: ABNORMAL
PLATELET # BLD AUTO: 247 K/UL (ref 150–350)
PLATELET # BLD EST: ABNORMAL 10*3/UL
PLATELET CLUMP BLD QL SMEAR: PRESENT
PMV BLD AUTO: 9.2 FL (ref 9.4–12.4)
POLYCHROMASIA BLD QL SMEAR: ABNORMAL
POTASSIUM SERPL-SCNC: 4 MEQ/L (ref 3.5–5.1)
PROT SERPL-MCNC: 7.2 G/DL (ref 6–8.2)
RBC # BLD AUTO: 2.72 M/UL (ref 4.5–5.8)
RSV RNA SPEC QL NAA+PROBE: NEGATIVE
SARS-COV-2 RNA RESP QL NAA+PROBE: NEGATIVE
SODIUM SERPL-SCNC: 138 MEQ/L (ref 136–145)
SPECIMEN EXP DATE BLD: NORMAL
TROPONIN I SERPL HS-MCNC: 16.8 PG/ML
TROPONIN I SERPL HS-MCNC: 18.1 PG/ML
WBC # BLD AUTO: 6.96 K/UL (ref 3.8–10.5)

## 2025-04-15 PROCEDURE — 80053 COMPREHEN METABOLIC PANEL: CPT | Performed by: EMERGENCY MEDICINE

## 2025-04-15 PROCEDURE — 36430 TRANSFUSION BLD/BLD COMPNT: CPT

## 2025-04-15 PROCEDURE — 84484 ASSAY OF TROPONIN QUANT: CPT | Mod: 91

## 2025-04-15 PROCEDURE — 30233N1 TRANSFUSION OF NONAUTOLOGOUS RED BLOOD CELLS INTO PERIPHERAL VEIN, PERCUTANEOUS APPROACH: ICD-10-PCS | Performed by: EMERGENCY MEDICINE

## 2025-04-15 PROCEDURE — 84484 ASSAY OF TROPONIN QUANT: CPT

## 2025-04-15 PROCEDURE — 93005 ELECTROCARDIOGRAM TRACING: CPT

## 2025-04-15 PROCEDURE — 99285 EMERGENCY DEPT VISIT HI MDM: CPT | Mod: 25

## 2025-04-15 PROCEDURE — 71045 X-RAY EXAM CHEST 1 VIEW: CPT

## 2025-04-15 PROCEDURE — 36415 COLL VENOUS BLD VENIPUNCTURE: CPT | Performed by: EMERGENCY MEDICINE

## 2025-04-15 PROCEDURE — 85025 COMPLETE CBC W/AUTO DIFF WBC: CPT | Performed by: EMERGENCY MEDICINE

## 2025-04-15 PROCEDURE — P9016 RBC LEUKOCYTES REDUCED: HCPCS

## 2025-04-15 PROCEDURE — 87637 SARSCOV2&INF A&B&RSV AMP PRB: CPT

## 2025-04-15 PROCEDURE — 86850 RBC ANTIBODY SCREEN: CPT

## 2025-04-15 RX ORDER — FERROUS SULFATE 325(65) MG
325 TABLET ORAL DAILY
Qty: 180 TABLET | Refills: 0 | Status: SHIPPED | OUTPATIENT
Start: 2025-04-15 | End: 2025-10-12

## 2025-04-15 RX ORDER — SODIUM CHLORIDE 9 MG/ML
5 INJECTION, SOLUTION INTRAVENOUS AS NEEDED
Status: DISCONTINUED | OUTPATIENT
Start: 2025-04-15 | End: 2025-04-15 | Stop reason: HOSPADM

## 2025-04-15 RX ORDER — POLYETHYLENE GLYCOL 3350 17 G/17G
17 POWDER, FOR SOLUTION ORAL DAILY
Qty: 510 G | Refills: 0 | Status: SHIPPED | OUTPATIENT
Start: 2025-04-15 | End: 2025-05-15

## 2025-04-15 ASSESSMENT — ENCOUNTER SYMPTOMS
EYE PAIN: 0
SORE THROAT: 0
SHORTNESS OF BREATH: 1
COLOR CHANGE: 0
HEMATURIA: 0
FEVER: 0
DYSURIA: 0
SEIZURES: 0
COUGH: 1
ARTHRALGIAS: 0
VOMITING: 0
ABDOMINAL PAIN: 0
CHILLS: 0
PALPITATIONS: 0
BACK PAIN: 0

## 2025-04-15 NOTE — ED PROVIDER NOTES
"Emergency Medicine Note  HPI   HISTORY OF PRESENT ILLNESS   Patient is 85-year-old male with  past medical history of CAD, hyperlipidemia, A-fib on Xarelto, TIA, BPH, mild intermittent asthma, aortic stenosis, prostate cancer s/p radiation presents to the ED due to low hemoglobin 6.8  From lab work yesterday.  This patient in the edition patient also complains of shortness of breath and coughing since he started yesterday.  Wife is concerned patient has RSV.  No blood in the stool no black stool, no signs of vomiting blood.    right total knee arthroplasty on February 18, 2025.  Baseline hemoglobin 11.  Postop hemoglobin 7.8.  Improvement late on the day to 8.9.  Patient was stable discharge at the time.          Abnormal Lab        Patient History   PAST HISTORY     Reviewed from Nursing Triage:       Past Medical History:   Diagnosis Date    Allergic rhinitis     Anemia     Asthma     takes singulair no inhalers    Atrial fibrillation (CMS/HCC)     BPH (benign prostatic hyperplasia)     Cataract     Cervical pain     Cervical spondylosis     Coronary artery stenosis     HOLM (dyspnea on exertion)     H/O heart artery stent     x2 stents    Hematuria 01/2025    needs bx Dr Simone Franklin    History of knee replacement     Hypercholesterolemia     Lumbar pain     Lumbar spondylosis     Osteoarthritis of hip     Osteoarthritis, hand     Other hypertrophic osteoarthropathy, multiple sites     Prostate cancer (CMS/HCC) 2022    radiation    Skin cancer     right calf    Status post angioplasty     TIA (transient ischemic attack) 2023    garbled speech, no residual    Urge incontinence        Past Surgical History   Procedure Laterality Date    Basal cell carcinoma excision  08/04/2000    Cardiac catheterization N/A 10/22/2004    Cardiac catheterization  08/05/2021    Cardiac surgery  05/18/2021    \"heart procedure\"    Coronary stent placement  10/22/2004    x2    Eye surgery Right 09/21/2017    laser    KNEE TOTAL REVISION " Right 2/18/2025    Performed by Jurgen Dow MD at [facility] OR [facility] Mohs surgery      Replacement total knee Left 06/01/1994    Replacement total knee Right 03/09/1994    Revision total knee arthroplasty Right 2015    2nd REVISION 2017    Shoulder surgery Right 01/06/2005    Shoulder surgery Left 01/12/1989    Total hip arthroplasty Right 2017       No family history on file.    Social History     Tobacco Use    Smoking status: Never    Smokeless tobacco: Never   Substance Use Topics    Alcohol use: Yes     Alcohol/week: 14.0 standard drinks of alcohol     Types: 14 Standard drinks or equivalent per week    Drug use: Never         Review of Systems   REVIEW OF SYSTEMS     Review of Systems   Constitutional:  Negative for chills and fever.   HENT:  Negative for ear pain and sore throat.    Eyes:  Negative for pain and visual disturbance.   Respiratory:  Positive for cough and shortness of breath.    Cardiovascular:  Negative for chest pain and palpitations.   Gastrointestinal:  Negative for abdominal pain and vomiting.   Genitourinary:  Negative for dysuria and hematuria.   Musculoskeletal:  Negative for arthralgias and back pain.   Skin:  Negative for color change and rash.   Neurological:  Negative for seizures and syncope.   All other systems reviewed and are negative.        VITALS     ED Vitals      Date/Time Temp Pulse Resp BP SpO2 [facility]   04/15/25 1459 -- 64 -- 167/76 97 % [facility]   04/15/25 1432 36.8 °C (98.3 °F) 62 16 169/74 98 % [facility]   04/15/25 1430 -- 60 -- -- 98 % [facility]   04/15/25 1331 36.6 °C (97.9 °F) 63 16 170/73 98 % [facility]   04/15/25 1231 36.7 °C (98 °F) 64 18 161/72 99 % [facility]   04/15/25 1216 36.8 °C (98.2 °F) 65 20 163/67 98 % [facility]   04/15/25 1146 -- 65 18 167/71 97 % [facility]   04/15/25 1059 -- 64 -- 147/64 96 % [facility]   04/15/25 1006 36.5 °C (97.7 °F) 67 18 172/65 97 % [facility]                         Physical Exam   PHYSICAL EXAM     Physical Exam  Vitals and nursing note reviewed.   Constitutional:       General: He is  not in acute distress.     Appearance: He is well-developed. He is not ill-appearing.   HENT:      Head: Normocephalic and atraumatic.   Eyes:      General: No scleral icterus.        Right eye: No discharge.         Left eye: No discharge.      Conjunctiva/sclera: Conjunctivae normal.   Cardiovascular:      Rate and Rhythm: Normal rate and regular rhythm.      Heart sounds: No murmur heard.  Pulmonary:      Effort: Pulmonary effort is normal. No respiratory distress.      Breath sounds: Normal breath sounds. No stridor. No wheezing, rhonchi or rales.   Abdominal:      Palpations: Abdomen is soft.      Tenderness: There is no abdominal tenderness. There is no guarding or rebound.   Genitourinary:     Rectum: Normal.   Musculoskeletal:         General: Swelling present. Normal range of motion.      Cervical back: Normal range of motion and neck supple. No rigidity.      Right lower leg: No edema.      Left lower leg: No edema.      Comments: Mild swelling on the right knee consistent with status post arthroplasty.  No signs of ecchymosis.   Skin:     General: Skin is warm and dry.      Coloration: Skin is pale.      Findings: No erythema or rash.   Neurological:      General: No focal deficit present.      Mental Status: He is alert.           PROCEDURES     Procedures     DATA     Results       Procedure Component Value Units Date/Time    SARS-COV-2 (COVID-19)/ FLU A/B, AND RSV, PCR Nasopharynx [[id number]]  (Normal) Collected: 04/15/25 1128    Specimen: Nasopharyngeal Swab from Nasopharynx Updated: 04/15/25 1231     SARS-CoV-2 (COVID-19) Negative     Influenza A Negative     Influenza B Negative     Respiratory Syncytial Virus Negative    Narrative:      Testing performed using real-time PCR for detection of COVID-19. EUA approved validation studies performed on site.     HS Troponin (with 2 hour reflex) [[id number]]  (Abnormal) Collected: 04/15/25 1145    Specimen: Blood, Venous Updated: 04/15/25 1226     High Sens  Troponin I 16.8 pg/mL     Prepare RBC-(BLOOD BANK ORDER for PRODUCT): 1 Units Transfusion indications: Hgb <8 g/dL and symptomatic anemia [[id number]] Collected: 04/15/25 1136     Updated: 04/15/25 1207     Product Code Q9543O75     Unit ID [id number]     Unit ABO O     Unit RH Positive     Crossmatch Compatible     Product Status IS     Unit Expiration Date/Time 517017899201     ISBT Code 5100    Type and Screen [facility] [[id number]] Collected: 04/15/25 1031    Specimen: Blood, Venous Updated: 04/15/25 1140     Specimen Expiration 04/18/2025     Antibody Screen Negative     ABO O     Rh Factor Positive     History Check Previous type on file    CBC and differential [[id number]]  (Abnormal) Collected: 04/15/25 1031    Specimen: Blood, Venous Updated: 04/15/25 1118     WBC 6.96 K/uL      RBC 2.72 M/uL      Hemoglobin 7.0 g/dL      Comment: This result has been called to Sheree June by Barbi on 04/15/2025 11:07:31, and has been read back.         Hematocrit 24.2 %      MCV 89.0 fL      MCH 25.7 pg      MCHC 28.9 g/dL      RDW 15.6 %      Platelets 247 K/uL      MPV 9.2 fL      Differential Type Auto     nRBC 0.3 %      Immature Granulocytes 0.3 %      Neutrophils 77.0 %      Lymphocytes 7.6 %      Monocytes 12.5 %      Eosinophils 2.0 %      Basophils 0.6 %      Immature Granulocytes, Absolute 0.02 K/uL      Neutrophils, Absolute 5.36 K/uL      Lymphocytes, Absolute 0.53 K/uL      Monocytes, Absolute 0.87 K/uL      Eosinophils, Absolute 0.14 K/uL      Basophils, Absolute 0.04 K/uL      Platelet Estimate Adequate (150,000-400,000)     Clumped Platelets Present     Hypochromia Occasional     Ovalocytes Occasional     Acanthocytes Occasional     Polychromasia Occasional    Comprehensive metabolic panel [[id number]]  (Abnormal) Collected: 04/15/25 1031    Specimen: Blood, Venous Updated: 04/15/25 1107     Sodium 138 mEQ/L      Potassium 4.0 mEQ/L      Comment: Results obtained on plasma. Plasma Potassium values may  be up to 0.4 mEQ/L less than serum values. The differences may be greater for patients with high platelet or white cell counts.        Chloride 107 mEQ/L      CO2 25 mEQ/L      BUN 17 mg/dL      Creatinine 0.8 mg/dL      Glucose 105 mg/dL      Calcium 8.9 mg/dL      AST (SGOT) 19 IU/L      ALT (SGPT) 11 IU/L      Alkaline Phosphatase 73 IU/L      Total Protein 7.2 g/dL      Comment: Test performed on plasma which typically contains approximately 0.4 g/dL more protein than serum.        Albumin 3.9 g/dL      Bilirubin, Total 1.4 mg/dL      eGFR >60.0 mL/min/1.73m*2      Comment: Calculation based on the Chronic Kidney Disease Epidemiology Collaboration (CKD-EPI) equation refit without adjustment for race.        Anion Gap 6 mEQ/L             Imaging Results              X-RAY CHEST 1 VIEW (Final result)  Result time 04/15/25 11:01:22      Final result                   Impression:    IMPRESSION:  No evidence of active disease in the chest.                         Narrative:    CLINICAL HISTORY:   sob    COMMENT:    PROCEDURE: Single frontal view of the chest.    COMPARISON:   Previous chest x-ray is dated February 4, 2020    Support lines, tubes, devices, and surgical hardware, material: None    Lungs and Pleura: Low lung volumes. The lungs appear clear. No consolidation. No  effusion or pneumothorax.    Cardiovascular and Mediastinum: The cardiomediastinal silhouette is stable    Other: No acute osseous abnormality.                                      ECG 12 lead          Scoring tools                                  ED Course & MDM   MDM / ED COURSE / CLINICAL IMPRESSION / DISPO     Medical Decision Making  Amount and/or Complexity of Data Reviewed  Labs: ordered.  Radiology: ordered.  ECG/medicine tests: ordered.    Risk  OTC drugs.  Prescription drug management.    Patient is 85-year-old male with  past medical history of CAD, hyperlipidemia, A-fib on Xarelto, TIA, BPH, mild intermittent asthma, aortic  stenosis, prostate cancer s/p radiation presents to the ED due to low hemoglobin 6.8  From lab work yesterday.  On physical lamination patient appeared to be pale.  Otherwise cardiopulmonary exam was unremarkable.  Abdominal exam was negative for any abdominal pain, ecchymosis.  Lab work was significant for low hemoglobin 7.0.  CBC was remarkable for MCV of 89 with increasing RDW.  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  Patient received 1 unit of blood for symptomatic anemia.  Chest x-ray was unremarkable for pneumonia.  Patient tolerated well.  Stable for discharge with outpatient follow-up with primary care doctor.  Hemoccult test was also negative as well.    Iron supplement and MiraLAX was given for the patient for symptomatic anemia.  ED Course as of 04/15/25 1647   Tue Apr 15, 2025   1133 EKG has some nonspecific changes.  It is slightly different from an EKG from January 2017.  That is the last EKG that I have a scanned image for in epic [SB]   1505 Hemoccult negative [TL]      ED Course User Index  [SB] Keith Carpenter MD  [TL] Jason Horne DO     Clinical Impression      Symptomatic anemia     _________________       ED Disposition   Discharge                       Jason Horne DO  Resident  04/15/25 1648

## 2025-04-15 NOTE — DISCHARGE INSTRUCTIONS
Follow-up with primary care doctor for long-term health management and symptomatic anemia.    Please take the iron supplement as needed for symptomatic anemia.      MiraLAX for possible side effect of constipation.

## 2025-04-15 NOTE — ED ATTESTATION NOTE
Procedures  Physical Exam  Review of Systems  Medical Decision Making  Amount and/or Complexity of Data Reviewed  Labs: ordered.        4/15/965612:35 AM  I have personally seen and examined the patient. I was involved in the care and medical decision making for this patient.    I reviewed and agree with the PA/NP/Resident's assessment and plan of care, with any exceptions as documented below.    My focused history, examination, assessment, and plan of care of Bertrand Maher Jr. is as follows:  The patient presents with anemia.  Patient had knee surgery recently.  Exam: Abdomen: Soft, nontender  Rectal guaiac negative  Impression/Plan/Medical Decision Making: We will recheck the hemoglobin.  If its low we will consider a transfusion.  If it is coming up we might be able to do iron and close follow-up    Vital Signs Review: Vital signs have been reviewed. The oxygen saturation is  SpO2: 97 % which is normal.    I was physically present for the key/critical portions of the following procedures: None    This document was created using Dragon dictation software.  There might be some typographical errors due to this technology.    We are in a period of time with increased volumes and decreased capacity.      Keith Carpenter MD  04/15/25 7059

## 2025-04-16 LAB
ATRIAL RATE: 192
CROSSMATCH: NORMAL
ISBT CODE: 5100
P AXIS: 127
PR INTERVAL: 166
PRODUCT CODE: NORMAL
PRODUCT STATUS: NORMAL
QRS DURATION: 114
QT INTERVAL: 450
QTC CALCULATION(BAZETT): 468
R AXIS: -42
SPECIMEN EXP DATE BLD: NORMAL
T WAVE AXIS: 72
UNIT ABO: NORMAL
UNIT ID: NORMAL
UNIT RH: POSITIVE
VENTRICULAR RATE: 65

## 2025-05-05 ENCOUNTER — APPOINTMENT (OUTPATIENT)
Dept: URBAN - METROPOLITAN AREA CLINIC 203 | Age: 85
Setting detail: DERMATOLOGY
End: 2025-05-08

## 2025-05-05 DIAGNOSIS — L57.8 OTHER SKIN CHANGES DUE TO CHRONIC EXPOSURE TO NONIONIZING RADIATION: ICD-10-CM

## 2025-05-05 DIAGNOSIS — L57.0 ACTINIC KERATOSIS: ICD-10-CM

## 2025-05-05 DIAGNOSIS — Z85.828 PERSONAL HISTORY OF OTHER MALIGNANT NEOPLASM OF SKIN: ICD-10-CM

## 2025-05-05 DIAGNOSIS — D18.0 HEMANGIOMA: ICD-10-CM

## 2025-05-05 DIAGNOSIS — L81.4 OTHER MELANIN HYPERPIGMENTATION: ICD-10-CM

## 2025-05-05 DIAGNOSIS — L82.1 OTHER SEBORRHEIC KERATOSIS: ICD-10-CM

## 2025-05-05 DIAGNOSIS — D22 MELANOCYTIC NEVI: ICD-10-CM

## 2025-05-05 PROBLEM — D18.01 HEMANGIOMA OF SKIN AND SUBCUTANEOUS TISSUE: Status: ACTIVE | Noted: 2025-05-05

## 2025-05-05 PROBLEM — D22.5 MELANOCYTIC NEVI OF TRUNK: Status: ACTIVE | Noted: 2025-05-05

## 2025-05-05 PROCEDURE — OTHER REASSURANCE: OTHER

## 2025-05-05 PROCEDURE — OTHER SUNSCREEN RECOMMENDATIONS: OTHER

## 2025-05-05 PROCEDURE — 17000 DESTRUCT PREMALG LESION: CPT

## 2025-05-05 PROCEDURE — OTHER COUNSELING: OTHER

## 2025-05-05 PROCEDURE — OTHER LIQUID NITROGEN: OTHER

## 2025-05-05 PROCEDURE — 99213 OFFICE O/P EST LOW 20 MIN: CPT | Mod: 25

## 2025-05-05 ASSESSMENT — LOCATION ZONE DERM
LOCATION ZONE: ARM
LOCATION ZONE: TRUNK

## 2025-05-05 ASSESSMENT — LOCATION DETAILED DESCRIPTION DERM
LOCATION DETAILED: INFERIOR THORACIC SPINE
LOCATION DETAILED: EPIGASTRIC SKIN
LOCATION DETAILED: SUPERIOR THORACIC SPINE
LOCATION DETAILED: PERIUMBILICAL SKIN
LOCATION DETAILED: LEFT SUPERIOR MEDIAL UPPER BACK
LOCATION DETAILED: LEFT POSTERIOR SHOULDER
LOCATION DETAILED: LEFT MEDIAL INFERIOR CHEST

## 2025-05-05 ASSESSMENT — LOCATION SIMPLE DESCRIPTION DERM
LOCATION SIMPLE: LEFT UPPER BACK
LOCATION SIMPLE: CHEST
LOCATION SIMPLE: ABDOMEN
LOCATION SIMPLE: LEFT SHOULDER
LOCATION SIMPLE: UPPER BACK

## (undated) DEVICE — DRAPE-U NON-STERILE

## (undated) DEVICE — SOLN IRRIG .9%SOD 3L

## (undated) DEVICE — DRESSING AQUACEL AG 14IN

## (undated) DEVICE — BLADE SAW SABO

## (undated) DEVICE — SET HANDPIECE INTERPULSE

## (undated) DEVICE — BRACE KNEE REGULAR COOL TROM

## (undated) DEVICE — BLADE SAGITTAL #152 STRYKER NARROW THICK NO OFFSET 12.5MM

## (undated) DEVICE — BANDAGE ESMARK 6X12 LATEX FREE

## (undated) DEVICE — SUTURE POLYSORB 2-0 UNDYED 1X30 P-17

## (undated) DEVICE — PACK TOTAL JOINT

## (undated) DEVICE — SUCTION 18FR FRAZIER DISPOSABLE

## (undated) DEVICE — VEST STERILE

## (undated) DEVICE — SUTURE MONOCRYL 4-0  Y496G PS-2 I8IN

## (undated) DEVICE — DRAPE TOWEL 17X23 NON-STERILE

## (undated) DEVICE — COVER LIGHTHANDLE STERILE BLUE

## (undated) DEVICE — PAD GROUND ELECTROSURGICAL W/CORD

## (undated) DEVICE — DRAPE U/ SHT SPLIT PLASTIC ST 24/CS

## (undated) DEVICE — SLEEVE SCD COMFORT KNEE LENGTH MED

## (undated) DEVICE — NEEDLE SAFE BLUNT 18G

## (undated) DEVICE — SYRINGE DISP LUER-LOK 30 CC

## (undated) DEVICE — TUBING SMOKE EVAC PENCIL COATED

## (undated) DEVICE — BLANKET UPPER BODY BAIR HUGGER

## (undated) DEVICE — GLOVE SZ 8.5 PROTEXIS PI

## (undated) DEVICE — GLOVE SZ 8.5 LINER PROTEXIS PI BL

## (undated) DEVICE — ADHESIVE SKIN DERMABOND ADVANCED 0.7ML

## (undated) DEVICE — *T* 3.2MM X 18.3MM METAL CUT HELIOCOIAL RASP

## (undated) DEVICE — KIT CEMENT MIXING CARTRIDGE AND NOZZLE

## (undated) DEVICE — GOWN SIRUS FABRNF RAGLAN XL ST 28/CS

## (undated) DEVICE — SUTURE POLYSORB 1 UNDYED 1X30 GS-12

## (undated) DEVICE — NEEDLE MAYO 1-2 CIRCLE 216735

## (undated) DEVICE — HOOD T7 PLUS

## (undated) DEVICE — SOLN IRRIG STER WATER 1000ML

## (undated) DEVICE — CUFF TOURNIQUET DISP 30 X 4

## (undated) DEVICE — Device

## (undated) DEVICE — MANIFOLD FOUR PORT NEPTUNE

## (undated) DEVICE — DRAPE 3/4 REINFORCED 53X77 20/CS

## (undated) DEVICE — BLADE SAGITTAL #127 STABLECUT

## (undated) DEVICE — APPLICATOR CHLORAPREP 26ML ORANGE TINT

## (undated) DEVICE — TRAP MUCUS SPECIMEN 40CC